# Patient Record
Sex: FEMALE | Race: WHITE | Employment: OTHER | URBAN - METROPOLITAN AREA
[De-identification: names, ages, dates, MRNs, and addresses within clinical notes are randomized per-mention and may not be internally consistent; named-entity substitution may affect disease eponyms.]

---

## 2019-02-05 ENCOUNTER — OFFICE VISIT (OUTPATIENT)
Dept: FAMILY MEDICINE CLINIC | Age: 84
End: 2019-02-05
Payer: MEDICARE

## 2019-02-05 VITALS
OXYGEN SATURATION: 98 % | TEMPERATURE: 98.3 F | HEART RATE: 78 BPM | WEIGHT: 200 LBS | DIASTOLIC BLOOD PRESSURE: 70 MMHG | BODY MASS INDEX: 33.32 KG/M2 | SYSTOLIC BLOOD PRESSURE: 120 MMHG | HEIGHT: 65 IN

## 2019-02-05 DIAGNOSIS — J18.9 PNEUMONIA DUE TO INFECTIOUS ORGANISM, UNSPECIFIED LATERALITY, UNSPECIFIED PART OF LUNG: Primary | ICD-10-CM

## 2019-02-05 PROCEDURE — 99204 OFFICE O/P NEW MOD 45 MIN: CPT | Performed by: NURSE PRACTITIONER

## 2019-02-05 PROCEDURE — 1101F PT FALLS ASSESS-DOCD LE1/YR: CPT | Performed by: NURSE PRACTITIONER

## 2019-02-05 PROCEDURE — G8400 PT W/DXA NO RESULTS DOC: HCPCS | Performed by: NURSE PRACTITIONER

## 2019-02-05 PROCEDURE — 1036F TOBACCO NON-USER: CPT | Performed by: NURSE PRACTITIONER

## 2019-02-05 PROCEDURE — G8427 DOCREV CUR MEDS BY ELIG CLIN: HCPCS | Performed by: NURSE PRACTITIONER

## 2019-02-05 PROCEDURE — G8417 CALC BMI ABV UP PARAM F/U: HCPCS | Performed by: NURSE PRACTITIONER

## 2019-02-05 PROCEDURE — 1090F PRES/ABSN URINE INCON ASSESS: CPT | Performed by: NURSE PRACTITIONER

## 2019-02-05 PROCEDURE — G8484 FLU IMMUNIZE NO ADMIN: HCPCS | Performed by: NURSE PRACTITIONER

## 2019-02-05 PROCEDURE — 1123F ACP DISCUSS/DSCN MKR DOCD: CPT | Performed by: NURSE PRACTITIONER

## 2019-02-05 PROCEDURE — 4040F PNEUMOC VAC/ADMIN/RCVD: CPT | Performed by: NURSE PRACTITIONER

## 2019-02-05 RX ORDER — POTASSIUM CHLORIDE 20 MEQ/1
TABLET, EXTENDED RELEASE ORAL
COMMUNITY
End: 2021-11-10 | Stop reason: CLARIF

## 2019-02-05 RX ORDER — AMITRIPTYLINE HYDROCHLORIDE 10 MG/1
TABLET, FILM COATED ORAL
COMMUNITY
End: 2021-12-10 | Stop reason: SDUPTHER

## 2019-02-05 RX ORDER — DICLOFENAC SODIUM 75 MG/1
TABLET, DELAYED RELEASE ORAL
COMMUNITY
Start: 2019-01-05 | End: 2021-11-10 | Stop reason: CLARIF

## 2019-02-05 RX ORDER — VALSARTAN 80 MG/1
TABLET ORAL
COMMUNITY
End: 2019-04-12 | Stop reason: DRUGHIGH

## 2019-02-05 RX ORDER — LEVOTHYROXINE SODIUM 88 UG/1
TABLET ORAL
COMMUNITY
End: 2019-04-12 | Stop reason: SDUPTHER

## 2019-02-05 RX ORDER — DOXYCYCLINE HYCLATE 100 MG
100 TABLET ORAL 2 TIMES DAILY
Qty: 20 TABLET | Refills: 0 | Status: SHIPPED | OUTPATIENT
Start: 2019-02-05 | End: 2019-02-15

## 2019-02-05 RX ORDER — ROPINIROLE 0.5 MG/1
TABLET, FILM COATED ORAL
COMMUNITY
End: 2019-04-12 | Stop reason: SDUPTHER

## 2019-02-05 RX ORDER — ESOMEPRAZOLE MAGNESIUM 40 MG/1
CAPSULE, DELAYED RELEASE ORAL
COMMUNITY

## 2019-02-05 RX ORDER — FUROSEMIDE 20 MG/1
TABLET ORAL
COMMUNITY
End: 2022-06-16 | Stop reason: SDUPTHER

## 2019-02-05 ASSESSMENT — ENCOUNTER SYMPTOMS
RHINORRHEA: 1
SHORTNESS OF BREATH: 1
WHEEZING: 1
SORE THROAT: 0
COUGH: 1

## 2019-02-21 ENCOUNTER — OFFICE VISIT (OUTPATIENT)
Dept: FAMILY MEDICINE CLINIC | Age: 84
End: 2019-02-21
Payer: MEDICARE

## 2019-02-21 VITALS
OXYGEN SATURATION: 97 % | RESPIRATION RATE: 15 BRPM | BODY MASS INDEX: 33.32 KG/M2 | WEIGHT: 200 LBS | DIASTOLIC BLOOD PRESSURE: 74 MMHG | TEMPERATURE: 98.5 F | HEART RATE: 93 BPM | SYSTOLIC BLOOD PRESSURE: 122 MMHG | HEIGHT: 65 IN

## 2019-02-21 DIAGNOSIS — E03.9 HYPOTHYROIDISM, UNSPECIFIED TYPE: ICD-10-CM

## 2019-02-21 DIAGNOSIS — M25.562 BILATERAL CHRONIC KNEE PAIN: ICD-10-CM

## 2019-02-21 DIAGNOSIS — J84.10 PULMONARY FIBROSIS (HCC): ICD-10-CM

## 2019-02-21 DIAGNOSIS — J40 BRONCHITIS: Primary | ICD-10-CM

## 2019-02-21 DIAGNOSIS — G25.81 RLS (RESTLESS LEGS SYNDROME): ICD-10-CM

## 2019-02-21 DIAGNOSIS — G89.29 BILATERAL CHRONIC KNEE PAIN: ICD-10-CM

## 2019-02-21 DIAGNOSIS — M25.561 BILATERAL CHRONIC KNEE PAIN: ICD-10-CM

## 2019-02-21 DIAGNOSIS — E55.9 VITAMIN D DEFICIENCY: ICD-10-CM

## 2019-02-21 PROBLEM — K21.9 GASTROESOPHAGEAL REFLUX DISEASE: Status: ACTIVE | Noted: 2019-02-21

## 2019-02-21 PROBLEM — M25.519 SHOULDER JOINT PAIN: Status: ACTIVE | Noted: 2019-02-21

## 2019-02-21 PROBLEM — M25.569 KNEE PAIN: Status: ACTIVE | Noted: 2019-02-21

## 2019-02-21 PROBLEM — M47.812 CERVICAL SPONDYLOSIS WITHOUT MYELOPATHY: Status: ACTIVE | Noted: 2019-02-21

## 2019-02-21 PROBLEM — M15.9 GENERALIZED OSTEOARTHRITIS: Status: ACTIVE | Noted: 2019-02-21

## 2019-02-21 PROBLEM — M17.10 ARTHRITIS OF KNEE: Status: ACTIVE | Noted: 2019-02-21

## 2019-02-21 PROBLEM — M25.559 HIP PAIN: Status: ACTIVE | Noted: 2019-02-21

## 2019-02-21 PROBLEM — M94.9 DISORDER OF BONE AND ARTICULAR CARTILAGE: Status: ACTIVE | Noted: 2019-02-21

## 2019-02-21 PROBLEM — Z91.81 AT RISK FOR FALLS: Status: ACTIVE | Noted: 2019-02-21

## 2019-02-21 PROBLEM — H40.9 GLAUCOMA: Status: ACTIVE | Noted: 2019-02-21

## 2019-02-21 PROBLEM — F32.1 MODERATE MAJOR DEPRESSION (HCC): Status: ACTIVE | Noted: 2018-06-29

## 2019-02-21 PROBLEM — L57.0 ACTINIC KERATOSIS: Status: ACTIVE | Noted: 2019-02-21

## 2019-02-21 PROBLEM — E66.9 OBESITY: Status: ACTIVE | Noted: 2019-02-21

## 2019-02-21 PROBLEM — M54.17 LUMBOSACRAL RADICULITIS: Status: ACTIVE | Noted: 2019-02-21

## 2019-02-21 PROBLEM — M54.50 LOW BACK PAIN: Status: ACTIVE | Noted: 2019-02-21

## 2019-02-21 PROBLEM — M47.817 LUMBOSACRAL SPONDYLOSIS WITHOUT MYELOPATHY: Status: ACTIVE | Noted: 2019-02-21

## 2019-02-21 PROBLEM — M89.9 DISORDER OF BONE AND ARTICULAR CARTILAGE: Status: ACTIVE | Noted: 2019-02-21

## 2019-02-21 PROBLEM — H40.023 OPEN ANGLE WITH BORDERLINE FINDINGS AND HIGH GLAUCOMA RISK IN BOTH EYES: Status: ACTIVE | Noted: 2019-02-21

## 2019-02-21 PROBLEM — M25.579 PAIN IN JOINT INVOLVING ANKLE AND FOOT: Status: ACTIVE | Noted: 2019-02-21

## 2019-02-21 PROBLEM — M24.549 CONTRACTURE OF JOINT OF HAND: Status: ACTIVE | Noted: 2019-02-21

## 2019-02-21 PROBLEM — E78.2 MIXED HYPERLIPIDEMIA: Status: ACTIVE | Noted: 2019-02-21

## 2019-02-21 PROBLEM — M17.9 OSTEOARTHRITIS OF KNEE: Status: ACTIVE | Noted: 2018-05-22

## 2019-02-21 PROBLEM — I10 ESSENTIAL HYPERTENSION: Status: ACTIVE | Noted: 2019-02-21

## 2019-02-21 PROBLEM — M16.9 OSTEOARTHRITIS OF HIP: Status: ACTIVE | Noted: 2018-05-22

## 2019-02-21 PROBLEM — M13.80 ALLERGIC ARTHRITIS: Status: ACTIVE | Noted: 2019-02-21

## 2019-02-21 PROCEDURE — 4040F PNEUMOC VAC/ADMIN/RCVD: CPT | Performed by: INTERNAL MEDICINE

## 2019-02-21 PROCEDURE — 1123F ACP DISCUSS/DSCN MKR DOCD: CPT | Performed by: INTERNAL MEDICINE

## 2019-02-21 PROCEDURE — G8417 CALC BMI ABV UP PARAM F/U: HCPCS | Performed by: INTERNAL MEDICINE

## 2019-02-21 PROCEDURE — G8482 FLU IMMUNIZE ORDER/ADMIN: HCPCS | Performed by: INTERNAL MEDICINE

## 2019-02-21 PROCEDURE — G8427 DOCREV CUR MEDS BY ELIG CLIN: HCPCS | Performed by: INTERNAL MEDICINE

## 2019-02-21 PROCEDURE — 99214 OFFICE O/P EST MOD 30 MIN: CPT | Performed by: INTERNAL MEDICINE

## 2019-02-21 PROCEDURE — 1090F PRES/ABSN URINE INCON ASSESS: CPT | Performed by: INTERNAL MEDICINE

## 2019-02-21 PROCEDURE — G8400 PT W/DXA NO RESULTS DOC: HCPCS | Performed by: INTERNAL MEDICINE

## 2019-02-21 PROCEDURE — 1036F TOBACCO NON-USER: CPT | Performed by: INTERNAL MEDICINE

## 2019-02-21 PROCEDURE — 1101F PT FALLS ASSESS-DOCD LE1/YR: CPT | Performed by: INTERNAL MEDICINE

## 2019-02-21 RX ORDER — GABAPENTIN 100 MG/1
100 CAPSULE ORAL NIGHTLY
Qty: 30 CAPSULE | Refills: 0 | Status: SHIPPED | OUTPATIENT
Start: 2019-02-21 | End: 2019-03-14 | Stop reason: SDUPTHER

## 2019-02-21 RX ORDER — PREDNISONE 10 MG/1
TABLET ORAL
Qty: 21 TABLET | Refills: 0 | Status: SHIPPED | OUTPATIENT
Start: 2019-02-21 | End: 2019-03-01 | Stop reason: ALTCHOICE

## 2019-02-21 RX ORDER — AMOXICILLIN 875 MG/1
875 TABLET, COATED ORAL 2 TIMES DAILY
Qty: 14 TABLET | Refills: 0 | Status: SHIPPED | OUTPATIENT
Start: 2019-02-21 | End: 2019-02-28

## 2019-02-21 ASSESSMENT — PATIENT HEALTH QUESTIONNAIRE - PHQ9
2. FEELING DOWN, DEPRESSED OR HOPELESS: 1
SUM OF ALL RESPONSES TO PHQ QUESTIONS 1-9: 2
SUM OF ALL RESPONSES TO PHQ9 QUESTIONS 1 & 2: 2
1. LITTLE INTEREST OR PLEASURE IN DOING THINGS: 1
SUM OF ALL RESPONSES TO PHQ QUESTIONS 1-9: 2

## 2019-02-21 ASSESSMENT — ENCOUNTER SYMPTOMS
COUGH: 1
ABDOMINAL PAIN: 0
SHORTNESS OF BREATH: 0
EYE PAIN: 0
BACK PAIN: 0

## 2019-02-22 DIAGNOSIS — E55.9 VITAMIN D DEFICIENCY: ICD-10-CM

## 2019-02-22 DIAGNOSIS — E03.9 HYPOTHYROIDISM, UNSPECIFIED TYPE: ICD-10-CM

## 2019-02-22 DIAGNOSIS — J84.10 PULMONARY FIBROSIS (HCC): ICD-10-CM

## 2019-02-22 LAB
ALBUMIN SERPL-MCNC: 4.3 G/DL (ref 3.5–4.6)
ALP BLD-CCNC: 93 U/L (ref 40–130)
ALT SERPL-CCNC: 13 U/L (ref 0–33)
ANION GAP SERPL CALCULATED.3IONS-SCNC: 20 MEQ/L (ref 9–15)
AST SERPL-CCNC: 26 U/L (ref 0–35)
BASOPHILS ABSOLUTE: 0.1 K/UL (ref 0–0.2)
BASOPHILS RELATIVE PERCENT: 1.2 %
BILIRUB SERPL-MCNC: 0.3 MG/DL (ref 0.2–0.7)
BUN BLDV-MCNC: 16 MG/DL (ref 8–23)
CALCIUM SERPL-MCNC: 8.8 MG/DL (ref 8.5–9.9)
CHLORIDE BLD-SCNC: 101 MEQ/L (ref 95–107)
CHOLESTEROL, TOTAL: 267 MG/DL (ref 0–199)
CO2: 19 MEQ/L (ref 20–31)
CREAT SERPL-MCNC: 0.61 MG/DL (ref 0.5–0.9)
EOSINOPHILS ABSOLUTE: 0 K/UL (ref 0–0.7)
EOSINOPHILS RELATIVE PERCENT: 0.1 %
GFR AFRICAN AMERICAN: >60
GFR NON-AFRICAN AMERICAN: >60
GLOBULIN: 3.4 G/DL (ref 2.3–3.5)
GLUCOSE BLD-MCNC: 88 MG/DL (ref 70–99)
HCT VFR BLD CALC: 38 % (ref 37–47)
HDLC SERPL-MCNC: 61 MG/DL (ref 40–59)
HEMOGLOBIN: 12 G/DL (ref 12–16)
LDL CHOLESTEROL CALCULATED: 176 MG/DL (ref 0–129)
LYMPHOCYTES ABSOLUTE: 3.2 K/UL (ref 1–4.8)
LYMPHOCYTES RELATIVE PERCENT: 38 %
MCH RBC QN AUTO: 26.1 PG (ref 27–31.3)
MCHC RBC AUTO-ENTMCNC: 31.6 % (ref 33–37)
MCV RBC AUTO: 82.4 FL (ref 82–100)
MONOCYTES ABSOLUTE: 0.6 K/UL (ref 0.2–0.8)
MONOCYTES RELATIVE PERCENT: 7.6 %
NEUTROPHILS ABSOLUTE: 4.5 K/UL (ref 1.4–6.5)
NEUTROPHILS RELATIVE PERCENT: 53.1 %
PDW BLD-RTO: 16.6 % (ref 11.5–14.5)
PLATELET # BLD: 209 K/UL (ref 130–400)
POTASSIUM SERPL-SCNC: 4.5 MEQ/L (ref 3.4–4.9)
RBC # BLD: 4.6 M/UL (ref 4.2–5.4)
SODIUM BLD-SCNC: 140 MEQ/L (ref 135–144)
TOTAL PROTEIN: 7.7 G/DL (ref 6.3–8)
TRIGL SERPL-MCNC: 150 MG/DL (ref 0–150)
TSH REFLEX: 5.24 UIU/ML (ref 0.44–3.86)
VITAMIN D 25-HYDROXY: 18.4 NG/ML (ref 30–100)
WBC # BLD: 8.5 K/UL (ref 4.8–10.8)

## 2019-02-23 LAB — T4 FREE: 0.98 NG/DL (ref 0.84–1.68)

## 2019-03-01 ENCOUNTER — TELEPHONE (OUTPATIENT)
Dept: FAMILY MEDICINE CLINIC | Age: 84
End: 2019-03-01

## 2019-03-01 ENCOUNTER — OFFICE VISIT (OUTPATIENT)
Dept: FAMILY MEDICINE CLINIC | Age: 84
End: 2019-03-01
Payer: MEDICARE

## 2019-03-01 VITALS
WEIGHT: 195.3 LBS | BODY MASS INDEX: 32.54 KG/M2 | OXYGEN SATURATION: 98 % | TEMPERATURE: 98.3 F | SYSTOLIC BLOOD PRESSURE: 124 MMHG | HEIGHT: 65 IN | RESPIRATION RATE: 15 BRPM | DIASTOLIC BLOOD PRESSURE: 78 MMHG | HEART RATE: 81 BPM

## 2019-03-01 DIAGNOSIS — R09.82 POST-NASAL DRIP: ICD-10-CM

## 2019-03-01 DIAGNOSIS — E55.9 VITAMIN D DEFICIENCY: ICD-10-CM

## 2019-03-01 DIAGNOSIS — E78.5 HYPERLIPIDEMIA, UNSPECIFIED HYPERLIPIDEMIA TYPE: ICD-10-CM

## 2019-03-01 DIAGNOSIS — M48.061 SPINAL STENOSIS OF LUMBAR REGION WITHOUT NEUROGENIC CLAUDICATION: ICD-10-CM

## 2019-03-01 DIAGNOSIS — N30.00 ACUTE CYSTITIS WITHOUT HEMATURIA: Primary | ICD-10-CM

## 2019-03-01 DIAGNOSIS — N93.9 VAGINAL BLEEDING: ICD-10-CM

## 2019-03-01 DIAGNOSIS — R01.1 HEART MURMUR: ICD-10-CM

## 2019-03-01 DIAGNOSIS — N30.00 ACUTE CYSTITIS WITHOUT HEMATURIA: ICD-10-CM

## 2019-03-01 LAB
BACTERIA: ABNORMAL /HPF
BILIRUBIN URINE: NEGATIVE
BLOOD, URINE: ABNORMAL
CLARITY: CLEAR
COLOR: YELLOW
EPITHELIAL CELLS, UA: ABNORMAL /HPF (ref 0–5)
GLUCOSE URINE: NEGATIVE MG/DL
HYALINE CASTS: ABNORMAL /HPF (ref 0–5)
KETONES, URINE: NEGATIVE MG/DL
LEUKOCYTE ESTERASE, URINE: ABNORMAL
NITRITE, URINE: NEGATIVE
PH UA: 6.5 (ref 5–9)
PROTEIN UA: NEGATIVE MG/DL
RBC UA: ABNORMAL /HPF (ref 0–5)
SPECIFIC GRAVITY UA: 1.01 (ref 1–1.03)
UROBILINOGEN, URINE: 0.2 E.U./DL
WBC UA: ABNORMAL /HPF (ref 0–5)

## 2019-03-01 PROCEDURE — G8400 PT W/DXA NO RESULTS DOC: HCPCS | Performed by: INTERNAL MEDICINE

## 2019-03-01 PROCEDURE — 1090F PRES/ABSN URINE INCON ASSESS: CPT | Performed by: INTERNAL MEDICINE

## 2019-03-01 PROCEDURE — 1036F TOBACCO NON-USER: CPT | Performed by: INTERNAL MEDICINE

## 2019-03-01 PROCEDURE — G8482 FLU IMMUNIZE ORDER/ADMIN: HCPCS | Performed by: INTERNAL MEDICINE

## 2019-03-01 PROCEDURE — G8427 DOCREV CUR MEDS BY ELIG CLIN: HCPCS | Performed by: INTERNAL MEDICINE

## 2019-03-01 PROCEDURE — 1123F ACP DISCUSS/DSCN MKR DOCD: CPT | Performed by: INTERNAL MEDICINE

## 2019-03-01 PROCEDURE — 99214 OFFICE O/P EST MOD 30 MIN: CPT | Performed by: INTERNAL MEDICINE

## 2019-03-01 PROCEDURE — G8417 CALC BMI ABV UP PARAM F/U: HCPCS | Performed by: INTERNAL MEDICINE

## 2019-03-01 PROCEDURE — 4040F PNEUMOC VAC/ADMIN/RCVD: CPT | Performed by: INTERNAL MEDICINE

## 2019-03-01 PROCEDURE — 1101F PT FALLS ASSESS-DOCD LE1/YR: CPT | Performed by: INTERNAL MEDICINE

## 2019-03-01 RX ORDER — FEXOFENADINE HCL 180 MG/1
180 TABLET ORAL DAILY
Qty: 30 TABLET | Refills: 0 | Status: SHIPPED | OUTPATIENT
Start: 2019-03-01 | End: 2019-04-03 | Stop reason: SDUPTHER

## 2019-03-01 RX ORDER — SULFAMETHOXAZOLE AND TRIMETHOPRIM 800; 160 MG/1; MG/1
1 TABLET ORAL 2 TIMES DAILY
Qty: 10 TABLET | Refills: 0 | Status: SHIPPED | OUTPATIENT
Start: 2019-03-01 | End: 2019-03-06

## 2019-03-01 RX ORDER — FLUTICASONE PROPIONATE 50 MCG
1 SPRAY, SUSPENSION (ML) NASAL DAILY
Qty: 1 BOTTLE | Refills: 0 | Status: SHIPPED | OUTPATIENT
Start: 2019-03-01 | End: 2021-11-10

## 2019-03-01 RX ORDER — ERGOCALCIFEROL 1.25 MG/1
50000 CAPSULE ORAL WEEKLY
Qty: 12 CAPSULE | Refills: 0 | Status: SHIPPED | OUTPATIENT
Start: 2019-03-01 | End: 2021-11-10 | Stop reason: ALTCHOICE

## 2019-03-01 ASSESSMENT — ENCOUNTER SYMPTOMS
SHORTNESS OF BREATH: 0
BACK PAIN: 0
ABDOMINAL PAIN: 0
EYE PAIN: 0
COUGH: 1

## 2019-03-03 ENCOUNTER — TELEPHONE (OUTPATIENT)
Dept: FAMILY MEDICINE CLINIC | Age: 84
End: 2019-03-03

## 2019-03-04 ENCOUNTER — TELEPHONE (OUTPATIENT)
Dept: FAMILY MEDICINE CLINIC | Age: 84
End: 2019-03-04

## 2019-03-04 LAB
ORGANISM: ABNORMAL
URINE CULTURE, ROUTINE: ABNORMAL
URINE CULTURE, ROUTINE: ABNORMAL

## 2019-03-05 DIAGNOSIS — E78.5 HYPERLIPIDEMIA, UNSPECIFIED HYPERLIPIDEMIA TYPE: Primary | ICD-10-CM

## 2019-03-07 ENCOUNTER — TELEPHONE (OUTPATIENT)
Dept: FAMILY MEDICINE CLINIC | Age: 84
End: 2019-03-07

## 2019-03-07 DIAGNOSIS — M17.0 OSTEOARTHRITIS OF BOTH KNEES, UNSPECIFIED OSTEOARTHRITIS TYPE: ICD-10-CM

## 2019-03-07 DIAGNOSIS — M48.061 SPINAL STENOSIS OF LUMBAR REGION, UNSPECIFIED WHETHER NEUROGENIC CLAUDICATION PRESENT: Primary | ICD-10-CM

## 2019-03-07 DIAGNOSIS — E78.5 HYPERLIPIDEMIA, UNSPECIFIED HYPERLIPIDEMIA TYPE: ICD-10-CM

## 2019-03-07 LAB
CHOLESTEROL, TOTAL: 255 MG/DL (ref 0–199)
HDLC SERPL-MCNC: 59 MG/DL (ref 40–59)
LDL CHOLESTEROL CALCULATED: 149 MG/DL (ref 0–129)
TRIGL SERPL-MCNC: 233 MG/DL (ref 0–150)

## 2019-03-08 DIAGNOSIS — M48.061 SPINAL STENOSIS OF LUMBAR REGION WITHOUT NEUROGENIC CLAUDICATION: Primary | ICD-10-CM

## 2019-03-08 DIAGNOSIS — M17.0 PRIMARY OSTEOARTHRITIS OF BOTH KNEES: ICD-10-CM

## 2019-03-12 ENCOUNTER — TELEPHONE (OUTPATIENT)
Dept: FAMILY MEDICINE CLINIC | Age: 84
End: 2019-03-12

## 2019-03-13 ENCOUNTER — OFFICE VISIT (OUTPATIENT)
Dept: PULMONOLOGY | Age: 84
End: 2019-03-13
Payer: MEDICARE

## 2019-03-13 VITALS
HEIGHT: 65 IN | TEMPERATURE: 98.6 F | BODY MASS INDEX: 32.49 KG/M2 | SYSTOLIC BLOOD PRESSURE: 132 MMHG | DIASTOLIC BLOOD PRESSURE: 88 MMHG | WEIGHT: 195 LBS | HEART RATE: 76 BPM | OXYGEN SATURATION: 98 %

## 2019-03-13 DIAGNOSIS — J30.9 ALLERGIC RHINITIS, UNSPECIFIED SEASONALITY, UNSPECIFIED TRIGGER: ICD-10-CM

## 2019-03-13 DIAGNOSIS — E66.09 CLASS 1 OBESITY DUE TO EXCESS CALORIES WITHOUT SERIOUS COMORBIDITY WITH BODY MASS INDEX (BMI) OF 32.0 TO 32.9 IN ADULT: ICD-10-CM

## 2019-03-13 DIAGNOSIS — R05.9 COUGH: ICD-10-CM

## 2019-03-13 DIAGNOSIS — G47.30 SLEEP APNEA, UNSPECIFIED TYPE: ICD-10-CM

## 2019-03-13 DIAGNOSIS — R06.02 SOB (SHORTNESS OF BREATH): Primary | ICD-10-CM

## 2019-03-13 PROCEDURE — 1101F PT FALLS ASSESS-DOCD LE1/YR: CPT | Performed by: INTERNAL MEDICINE

## 2019-03-13 PROCEDURE — 1123F ACP DISCUSS/DSCN MKR DOCD: CPT | Performed by: INTERNAL MEDICINE

## 2019-03-13 PROCEDURE — G8482 FLU IMMUNIZE ORDER/ADMIN: HCPCS | Performed by: INTERNAL MEDICINE

## 2019-03-13 PROCEDURE — 99204 OFFICE O/P NEW MOD 45 MIN: CPT | Performed by: INTERNAL MEDICINE

## 2019-03-13 PROCEDURE — G8417 CALC BMI ABV UP PARAM F/U: HCPCS | Performed by: INTERNAL MEDICINE

## 2019-03-13 PROCEDURE — G8427 DOCREV CUR MEDS BY ELIG CLIN: HCPCS | Performed by: INTERNAL MEDICINE

## 2019-03-13 PROCEDURE — 1090F PRES/ABSN URINE INCON ASSESS: CPT | Performed by: INTERNAL MEDICINE

## 2019-03-13 PROCEDURE — 1036F TOBACCO NON-USER: CPT | Performed by: INTERNAL MEDICINE

## 2019-03-13 PROCEDURE — 4040F PNEUMOC VAC/ADMIN/RCVD: CPT | Performed by: INTERNAL MEDICINE

## 2019-03-13 PROCEDURE — G8400 PT W/DXA NO RESULTS DOC: HCPCS | Performed by: INTERNAL MEDICINE

## 2019-03-13 RX ORDER — AZELASTINE 1 MG/ML
1 SPRAY, METERED NASAL 2 TIMES DAILY
Qty: 1 BOTTLE | Refills: 3 | Status: SHIPPED | OUTPATIENT
Start: 2019-03-13 | End: 2021-11-10 | Stop reason: CLARIF

## 2019-03-14 ENCOUNTER — HOSPITAL ENCOUNTER (OUTPATIENT)
Dept: SLEEP CENTER | Age: 84
Discharge: HOME | End: 2019-03-16
Payer: MEDICARE

## 2019-03-14 DIAGNOSIS — G25.81 RLS (RESTLESS LEGS SYNDROME): ICD-10-CM

## 2019-03-14 DIAGNOSIS — G89.29 BILATERAL CHRONIC KNEE PAIN: ICD-10-CM

## 2019-03-14 DIAGNOSIS — M25.562 BILATERAL CHRONIC KNEE PAIN: ICD-10-CM

## 2019-03-14 DIAGNOSIS — M25.561 BILATERAL CHRONIC KNEE PAIN: ICD-10-CM

## 2019-03-14 PROCEDURE — 95810 POLYSOM 6/> YRS 4/> PARAM: CPT | Performed by: INTERNAL MEDICINE

## 2019-03-14 PROCEDURE — 95810 POLYSOM 6/> YRS 4/> PARAM: CPT

## 2019-03-14 RX ORDER — GABAPENTIN 100 MG/1
100 CAPSULE ORAL NIGHTLY
Qty: 30 CAPSULE | Refills: 2 | Status: SHIPPED | OUTPATIENT
Start: 2019-03-24 | End: 2021-11-10

## 2019-03-18 ENCOUNTER — TELEPHONE (OUTPATIENT)
Dept: FAMILY MEDICINE CLINIC | Age: 84
End: 2019-03-18

## 2019-03-22 ENCOUNTER — HOSPITAL ENCOUNTER (OUTPATIENT)
Dept: PULMONOLOGY | Age: 84
Discharge: HOME | End: 2019-03-22
Payer: MEDICARE

## 2019-03-22 ENCOUNTER — HOSPITAL ENCOUNTER (OUTPATIENT)
Dept: CT IMAGING | Age: 84
Discharge: HOME | End: 2019-03-24
Payer: MEDICARE

## 2019-03-22 ENCOUNTER — HOSPITAL ENCOUNTER (OUTPATIENT)
Dept: NON INVASIVE DIAGNOSTICS | Age: 84
Discharge: HOME | End: 2019-03-22
Payer: MEDICARE

## 2019-03-22 VITALS — BODY MASS INDEX: 33.29 KG/M2 | HEIGHT: 64 IN | WEIGHT: 195 LBS

## 2019-03-22 DIAGNOSIS — J84.10 PULMONARY FIBROSIS (HCC): ICD-10-CM

## 2019-03-22 DIAGNOSIS — R01.1 HEART MURMUR: ICD-10-CM

## 2019-03-22 LAB
LV EF: 60 %
LVEF MODALITY: NORMAL

## 2019-03-22 PROCEDURE — 6360000002 HC RX W HCPCS: Performed by: INTERNAL MEDICINE

## 2019-03-22 PROCEDURE — 94726 PLETHYSMOGRAPHY LUNG VOLUMES: CPT | Performed by: INTERNAL MEDICINE

## 2019-03-22 PROCEDURE — 94060 EVALUATION OF WHEEZING: CPT | Performed by: INTERNAL MEDICINE

## 2019-03-22 PROCEDURE — 71250 CT THORAX DX C-: CPT

## 2019-03-22 PROCEDURE — 94726 PLETHYSMOGRAPHY LUNG VOLUMES: CPT

## 2019-03-22 PROCEDURE — 94060 EVALUATION OF WHEEZING: CPT

## 2019-03-22 PROCEDURE — 93306 TTE W/DOPPLER COMPLETE: CPT

## 2019-03-22 RX ORDER — SODIUM CHLORIDE 0.9 % (FLUSH) 0.9 %
10 SYRINGE (ML) INJECTION
Status: DISCONTINUED | OUTPATIENT
Start: 2019-03-22 | End: 2019-03-22

## 2019-03-22 RX ORDER — ALBUTEROL SULFATE 2.5 MG/3ML
2.5 SOLUTION RESPIRATORY (INHALATION) ONCE
Status: COMPLETED | OUTPATIENT
Start: 2019-03-22 | End: 2019-03-22

## 2019-03-22 RX ADMIN — ALBUTEROL SULFATE 2.5 MG: 2.5 SOLUTION RESPIRATORY (INHALATION) at 15:15

## 2019-03-25 DIAGNOSIS — G47.30 SLEEP APNEA, UNSPECIFIED TYPE: ICD-10-CM

## 2019-04-03 ENCOUNTER — OFFICE VISIT (OUTPATIENT)
Dept: PULMONOLOGY | Age: 84
End: 2019-04-03
Payer: MEDICARE

## 2019-04-03 VITALS
BODY MASS INDEX: 32.39 KG/M2 | DIASTOLIC BLOOD PRESSURE: 80 MMHG | TEMPERATURE: 97.6 F | WEIGHT: 194.4 LBS | SYSTOLIC BLOOD PRESSURE: 126 MMHG | HEIGHT: 65 IN | OXYGEN SATURATION: 98 % | HEART RATE: 77 BPM

## 2019-04-03 DIAGNOSIS — J30.9 ALLERGIC RHINITIS, UNSPECIFIED SEASONALITY, UNSPECIFIED TRIGGER: ICD-10-CM

## 2019-04-03 DIAGNOSIS — E66.09 CLASS 1 OBESITY DUE TO EXCESS CALORIES WITHOUT SERIOUS COMORBIDITY WITH BODY MASS INDEX (BMI) OF 32.0 TO 32.9 IN ADULT: ICD-10-CM

## 2019-04-03 DIAGNOSIS — R06.09 DOE (DYSPNEA ON EXERTION): ICD-10-CM

## 2019-04-03 DIAGNOSIS — I51.89 DIASTOLIC DYSFUNCTION: ICD-10-CM

## 2019-04-03 DIAGNOSIS — J43.2 CENTRILOBULAR EMPHYSEMA (HCC): ICD-10-CM

## 2019-04-03 DIAGNOSIS — R05.9 COUGH: Primary | ICD-10-CM

## 2019-04-03 PROCEDURE — 4040F PNEUMOC VAC/ADMIN/RCVD: CPT | Performed by: INTERNAL MEDICINE

## 2019-04-03 PROCEDURE — G8417 CALC BMI ABV UP PARAM F/U: HCPCS | Performed by: INTERNAL MEDICINE

## 2019-04-03 PROCEDURE — 1036F TOBACCO NON-USER: CPT | Performed by: INTERNAL MEDICINE

## 2019-04-03 PROCEDURE — 99214 OFFICE O/P EST MOD 30 MIN: CPT | Performed by: INTERNAL MEDICINE

## 2019-04-03 PROCEDURE — G8400 PT W/DXA NO RESULTS DOC: HCPCS | Performed by: INTERNAL MEDICINE

## 2019-04-03 PROCEDURE — G8427 DOCREV CUR MEDS BY ELIG CLIN: HCPCS | Performed by: INTERNAL MEDICINE

## 2019-04-03 PROCEDURE — G8926 SPIRO NO PERF OR DOC: HCPCS | Performed by: INTERNAL MEDICINE

## 2019-04-03 PROCEDURE — 1090F PRES/ABSN URINE INCON ASSESS: CPT | Performed by: INTERNAL MEDICINE

## 2019-04-03 PROCEDURE — 1123F ACP DISCUSS/DSCN MKR DOCD: CPT | Performed by: INTERNAL MEDICINE

## 2019-04-03 PROCEDURE — 3023F SPIROM DOC REV: CPT | Performed by: INTERNAL MEDICINE

## 2019-04-03 NOTE — PROGRESS NOTES
Subjective:     Shelia Flores is a 82 y.o. female whocomplains today of:     Chief Complaint   Patient presents with    Follow-up     SOB    Results     SS       HPI  Patient presents for SOB   Reports SOB with exertion, still same no significant changes, slows her down but still able to do her daily activity with no limitation    No chest pain   + GERD   Cough is improved and mainly feels like throat clearing,   No swelling in lower ext   No fever and no weight change         Allergies:  Azithromycin and Ceftin  [cefuroxime axetil]     Past Medical History:   Diagnosis Date    Arthritis of both knees     Deviated septum     per patient     [facility] [address]     Pulmonary fibrosis ([facility] [address]     per patient     Spinal stenosis      History reviewed. No pertinent surgical history.   Family History   Problem Relation Age of Onset    High Cholesterol Mother     High Cholesterol Father     Heart Failure Father     High Cholesterol Sister     Heart Failure Sister     Heart Failure Maternal Grandmother     High Cholesterol Maternal Grandmother     Heart Failure Paternal Grandfather     High Cholesterol Paternal Grandfather      Social History     Socioeconomic History    Marital status:      Spouse name: Not on file    Number of children: Not on file    Years of education: Not on file    Highest education level: Not on file   Occupational History    Not on file   Social Needs    Financial resource strain: Not on file    Food insecurity:     Worry: Not on file     Inability: Not on file    Transportation needs:     Medical: Not on file     Non-medical: Not on file   Tobacco Use    Smoking status: Never Smoker    Smokeless tobacco: Never Used   Substance and Sexual Activity    Alcohol use: No    Drug use: No    Sexual activity: Not on file   Lifestyle    Physical activity:     Days per week: Not on file     Minutes per session: Not on file    Stress: Not on file   Relationships    (LASIX) 20 MG tablet Lasix 20 mg tablet   TAKE 1 TABLET BY MOUTH ONCE DAILY AS NEEDED      esomeprazole (NEXIUM) 40 MG delayed release capsule Nexium 40 mg capsule,delayed release      rOPINIRole (REQUIP) 0.5 MG tablet ropinirole 0.5 mg tablet      levothyroxine (SYNTHROID) 88 MCG tablet Synthroid 88 mcg tablet       No current facility-administered medications for this visit. Objective:     Vitals:    04/03/19 1006   BP: 126/80   Site: Left Upper Arm   Pulse: 77   Temp: 97.6 °F (36.4 °C)   TempSrc: Tympanic   SpO2: 98%   Weight: 194 lb 6.4 oz (88.2 kg)   Height: 5' 5\" (1.651 m)         Physical Exam   Constitutional: She is oriented to person, place, and time. She appears well-developed and well-nourished. No distress. HENT:   Head: Normocephalic and atraumatic. Eyes: Pupils are equal, round, and reactive to light. Conjunctivae are normal.   Neck: Normal range of motion. Neck supple. Cardiovascular: Normal rate and regular rhythm. Exam reveals no gallop and no friction rub. No murmur heard. Pulmonary/Chest: Effort normal and breath sounds normal. No respiratory distress. She has no wheezes. She has no rales. She exhibits no tenderness. Abdominal: Soft. She exhibits no distension. There is no tenderness. There is no rebound. Musculoskeletal: She exhibits no edema or tenderness. Lymphadenopathy:     She has no cervical adenopathy. Neurological: She is alert and oriented to person, place, and time. Skin: Skin is warm and dry. She is not diaphoretic. No erythema. Psychiatric: She has a normal mood and affect. Judgment normal.     Imaging studies reviewed by me  CT chest reviewed by me, no fibrosis, scaring at the base, + emphysema   Lab results reviewed in chart  PFT mild restriction   ECHO: diastolic dysfunction, EF 01%     Assessment and Plan       Diagnosis Orders   1. Cough     2. ROBIN (dyspnea on exertion)  tiotropium (SPIRIVA RESPIMAT) 2.5 MCG/ACT AERS inhaler   3.  Class 1 obesity due to excess calories without serious comorbidity with body mass index (BMI) of 32.0 to 32.9 in adult     4. Allergic rhinitis, unspecified seasonality, unspecified trigger     5. Diastolic dysfunction     6. Centrilobular emphysema (HCC)  tiotropium (SPIRIVA RESPIMAT) 2.5 MCG/ACT AERS inhaler     · Cough, likely UACS , improved with Flonase and Azelastine, also has GERD and ON PPI. No pulm fibrosis on CT   · ROBIN, likely multifactorial due to obesity and diastolic dysfunction, and emphysema, will start LAMA and evaluate on follow up   · Allergic rhinitis, cont same       No orders of the defined types were placed in this encounter. Orders Placed This Encounter   Medications    tiotropium (SPIRIVA RESPIMAT) 2.5 MCG/ACT AERS inhaler     Sig: Inhale 2 puffs into the lungs daily     Dispense:  1 Inhaler     Refill:  2      Discussed with patient the importance of exercise and weight control and  overall health and well-being.     Reviewed with the patient: current clinical status, medications, activities and diet.      Side effects, adverse effects of the medication prescribed today, as well as treatment plan and result expectations have been discussed with the patient who expresses understanding and desires to proceed.      Return in about 3 months (around 7/3/2019).      Nvuia Giraldo MD

## 2019-04-12 ENCOUNTER — OFFICE VISIT (OUTPATIENT)
Dept: FAMILY MEDICINE CLINIC | Age: 84
End: 2019-04-12
Payer: MEDICARE

## 2019-04-12 ENCOUNTER — OFFICE VISIT (OUTPATIENT)
Dept: OBGYN CLINIC | Age: 84
End: 2019-04-12
Payer: MEDICARE

## 2019-04-12 VITALS
SYSTOLIC BLOOD PRESSURE: 152 MMHG | WEIGHT: 191 LBS | RESPIRATION RATE: 15 BRPM | BODY MASS INDEX: 31.82 KG/M2 | OXYGEN SATURATION: 97 % | DIASTOLIC BLOOD PRESSURE: 80 MMHG | TEMPERATURE: 97.6 F | HEART RATE: 75 BPM | HEIGHT: 65 IN

## 2019-04-12 VITALS
DIASTOLIC BLOOD PRESSURE: 72 MMHG | HEIGHT: 65 IN | WEIGHT: 193 LBS | BODY MASS INDEX: 32.15 KG/M2 | SYSTOLIC BLOOD PRESSURE: 132 MMHG

## 2019-04-12 DIAGNOSIS — E03.9 HYPOTHYROIDISM, UNSPECIFIED TYPE: ICD-10-CM

## 2019-04-12 DIAGNOSIS — I10 ESSENTIAL HYPERTENSION: ICD-10-CM

## 2019-04-12 DIAGNOSIS — G25.81 RESTLESS LEG SYNDROME: ICD-10-CM

## 2019-04-12 DIAGNOSIS — M48.062 SPINAL STENOSIS OF LUMBAR REGION WITH NEUROGENIC CLAUDICATION: Primary | ICD-10-CM

## 2019-04-12 DIAGNOSIS — N95.0 POSTMENOPAUSAL BLEEDING: ICD-10-CM

## 2019-04-12 DIAGNOSIS — M17.0 BILATERAL PRIMARY OSTEOARTHRITIS OF KNEE: ICD-10-CM

## 2019-04-12 DIAGNOSIS — N93.9 VAGINAL BLEEDING: Primary | ICD-10-CM

## 2019-04-12 PROCEDURE — 99204 OFFICE O/P NEW MOD 45 MIN: CPT | Performed by: OBSTETRICS & GYNECOLOGY

## 2019-04-12 PROCEDURE — 99214 OFFICE O/P EST MOD 30 MIN: CPT | Performed by: INTERNAL MEDICINE

## 2019-04-12 PROCEDURE — G8427 DOCREV CUR MEDS BY ELIG CLIN: HCPCS | Performed by: INTERNAL MEDICINE

## 2019-04-12 PROCEDURE — 1036F TOBACCO NON-USER: CPT | Performed by: INTERNAL MEDICINE

## 2019-04-12 PROCEDURE — 1090F PRES/ABSN URINE INCON ASSESS: CPT | Performed by: OBSTETRICS & GYNECOLOGY

## 2019-04-12 PROCEDURE — 1090F PRES/ABSN URINE INCON ASSESS: CPT | Performed by: INTERNAL MEDICINE

## 2019-04-12 PROCEDURE — 1123F ACP DISCUSS/DSCN MKR DOCD: CPT | Performed by: OBSTETRICS & GYNECOLOGY

## 2019-04-12 PROCEDURE — 4040F PNEUMOC VAC/ADMIN/RCVD: CPT | Performed by: OBSTETRICS & GYNECOLOGY

## 2019-04-12 PROCEDURE — 1123F ACP DISCUSS/DSCN MKR DOCD: CPT | Performed by: INTERNAL MEDICINE

## 2019-04-12 PROCEDURE — G8400 PT W/DXA NO RESULTS DOC: HCPCS | Performed by: INTERNAL MEDICINE

## 2019-04-12 PROCEDURE — 4040F PNEUMOC VAC/ADMIN/RCVD: CPT | Performed by: INTERNAL MEDICINE

## 2019-04-12 PROCEDURE — G8417 CALC BMI ABV UP PARAM F/U: HCPCS | Performed by: INTERNAL MEDICINE

## 2019-04-12 PROCEDURE — 1036F TOBACCO NON-USER: CPT | Performed by: OBSTETRICS & GYNECOLOGY

## 2019-04-12 PROCEDURE — G8400 PT W/DXA NO RESULTS DOC: HCPCS | Performed by: OBSTETRICS & GYNECOLOGY

## 2019-04-12 PROCEDURE — G8427 DOCREV CUR MEDS BY ELIG CLIN: HCPCS | Performed by: OBSTETRICS & GYNECOLOGY

## 2019-04-12 PROCEDURE — G8417 CALC BMI ABV UP PARAM F/U: HCPCS | Performed by: OBSTETRICS & GYNECOLOGY

## 2019-04-12 RX ORDER — LEVOTHYROXINE SODIUM 88 UG/1
TABLET ORAL
Qty: 90 TABLET | Refills: 1 | Status: SHIPPED | OUTPATIENT
Start: 2019-04-12 | End: 2021-12-10 | Stop reason: SDUPTHER

## 2019-04-12 RX ORDER — ROPINIROLE 0.5 MG/1
TABLET, FILM COATED ORAL
Qty: 90 TABLET | Refills: 1 | Status: SHIPPED | OUTPATIENT
Start: 2019-04-12 | End: 2021-12-10 | Stop reason: SDUPTHER

## 2019-04-12 RX ORDER — VALSARTAN 40 MG/1
40 TABLET ORAL DAILY
Qty: 30 TABLET | Refills: 1 | Status: SHIPPED | OUTPATIENT
Start: 2019-04-12 | End: 2021-11-10 | Stop reason: ALTCHOICE

## 2019-04-12 ASSESSMENT — ENCOUNTER SYMPTOMS
BACK PAIN: 0
SHORTNESS OF BREATH: 0
ABDOMINAL PAIN: 0
EYE PAIN: 0

## 2019-04-12 NOTE — PROGRESS NOTES
Subjective:      Patient ID: Joseph Alba is a 82 y.o. female who presents today with:  Chief Complaint   Patient presents with    Follow-up    Knee Pain     patient c/o pain in both knees, is awaiting information on the order for the scooter      Discuss Medications     patient mentions that she hasnt been taking the BP medication     Hypothyroidism    Other     spinal stenosis        HPI     Knee pain-Both knees, OA, never had steroid injections done. Known advanced age. Not walking     RLS-Chronic issue, improved with ropinirole. No side effects. Gabapentin also helps. She has known lumbar spinal stenosis. Lumbar spinal stenosis-Chronic problem, gabapentin helps. She says she's had it for 20 years. Last MRI was na. She's afraid to have a MRI done. Hypothyroidism-Hypothyroidism-Chronic, improved with synthroid 88 micrograms once daily. Compliant. Denies being cold. Stable. Hypertension     Past Medical History:   Diagnosis Date    Arthritis of both knees     Deviated septum     per patient     [facility] arthritis ([facility] [address]     Pulmonary fibrosis ([facility] [address]     per patient     Spinal stenosis      History reviewed. No pertinent surgical history.   Social History     Socioeconomic History    Marital status:      Spouse name: Not on file    Number of children: Not on file    Years of education: Not on file    Highest education level: Not on file   Occupational History    Not on file   Social Needs    Financial resource strain: Not on file    Food insecurity:     Worry: Not on file     Inability: Not on file    Transportation needs:     Medical: Not on file     Non-medical: Not on file   Tobacco Use    Smoking status: Never Smoker    Smokeless tobacco: Never Used   Substance and Sexual Activity    Alcohol use: No    Drug use: No    Sexual activity: Not Currently   Lifestyle    Physical activity:     Days per week: Not on file     Minutes per session: Not on file    Stress: Not on file   Relationships    Social connections:     Talks on phone: Not on file     Gets together: Not on file     Attends [employer] service: Not on file     Active member of club or organization: Not on file     Attends meetings of clubs or organizations: Not on file     Relationship status: Not on file    Intimate partner violence:     Fear of current or ex partner: Not on file     Emotionally abused: Not on file     Physically abused: Not on file     Forced sexual activity: Not on file   Other Topics Concern    Not on file   Social History Narrative    Not on file     Allergies   Allergen Reactions    Azithromycin Nausea And Vomiting    Ceftin  [Cefuroxime Axetil] Nausea And Vomiting     Current Outpatient Medications on File Prior to Visit   Medication Sig Dispense Refill    tiotropium (SPIRIVA RESPIMAT) 2.5 MCG/ACT AERS inhaler Inhale 2 puffs into the lungs daily 1 Inhaler 2    EQ ALLERGY RELIEF 180 MG tablet TAKE 1 TABLET BY MOUTH ONCE DAILY 90 tablet 1    gabapentin (NEURONTIN) 100 MG capsule Take 1 capsule by mouth nightly for 30 days.  30 capsule 2    azelastine (ASTELIN) 0.1 % nasal spray 1 spray by Nasal route 2 times daily Use in each nostril as directed 1 Bottle 3    Scooter MISC by Does not apply route Length of need 99 1 each 0    fluticasone (FLONASE) 50 MCG/ACT nasal spray 1 spray by Nasal route daily 1 Bottle 0    vitamin D (ERGOCALCIFEROL) 20307 units CAPS capsule Take 1 capsule by mouth once a week 12 capsule 0    Evolocumab (REPATHA) 140 MG/ML SOSY Inject 140 mg once a month 1 Syringe 5    amitriptyline (ELAVIL) 10 MG tablet amitriptyline 10 mg tablet   TAKE TWO TABLETS BY MOUTH ONCE DAILY AS NEEDED      Sucralfate (CARAFATE PO) Carafate      diclofenac (VOLTAREN) 75 MG EC tablet       potassium chloride (KLOR-CON M20) 20 MEQ extended release tablet Klor-Con M20 mEq tablet,extended release   TAKE ONE TABLET BY MOUTH TWICE DAILY AS NEEDED      furosemide (LASIX) 20 MG tablet Lasix CONTRAST   2. Bilateral primary osteoarthritis of knee     3. Hypothyroidism, unspecified type  levothyroxine (SYNTHROID) 88 MCG tablet   4. Restless leg syndrome  rOPINIRole (REQUIP) 0.5 MG tablet   5. Essential hypertension  valsartan (DIOVAN) 40 MG tablet         Plan:    bp check in a few days once she's back on the valsartan  They understands risk of stroke, MI, etc.   MRI  Neurosurgery  Patient to discuss with family about goals of care  Understands it will get worse  Meds per below  Restart diovan, bp check in a few days, might need increased to 80 mg once daily. Orders Placed This Encounter   Procedures    MRI LUMBAR SPINE W WO CONTRAST     Standing Status:   Future     Standing Expiration Date:   4/12/2020     Order Specific Question:   Reason for exam:     Answer:   known spinal stenosis    SUSANA Ibrahim MD, Neurosurgery, [place]     Referral Priority:   Routine     Referral Type:   Eval and Treat     Referral Reason:   Specialty Services Required     Referred to Provider:   Anmol Romero MD     Requested Specialty:   Neurosurgery     Number of Visits Requested:   1     Orders Placed This Encounter   Medications    rOPINIRole (REQUIP) 0.5 MG tablet     Sig: ropinirole 0.5 mg tablet     Dispense:  90 tablet     Refill:  1    levothyroxine (SYNTHROID) 88 MCG tablet     Sig: Synthroid 88 mcg tablet     Dispense:  90 tablet     Refill:  1    valsartan (DIOVAN) 40 MG tablet     Sig: Take 1 tablet by mouth daily     Dispense:  30 tablet     Refill:  1       Return for regularly scheduled appointment with PCP, worsening symptoms, call ASAP for appointment. Maurisio Escalona MD    If anything should change or worsen call ASAP, don't wait for next scheduled appointment.

## 2019-04-14 ASSESSMENT — ENCOUNTER SYMPTOMS
SHORTNESS OF BREATH: 0
ABDOMINAL PAIN: 1
APNEA: 0

## 2019-04-15 NOTE — PROGRESS NOTES
Subjective:      Patient ID:  Misael Tse is a 82 y.o. female with chief complaint of:  Chief Complaint   Patient presents with    New Patient    Vaginal Bleeding     pt states one episode about 6wks ago       Patient is 83 yo postmenopausal female she presents with history of acute onset of vaginal bleeding. She state blood was running down her leg when she finished bathing. She is not sure if there was any trauma with bathing she had never had such bleeding before or since. No pelvic pain no cramping with onset. Denies the use of blood thinners      Past Medical History:   Diagnosis Date    Arthritis of both knees     Deviated septum     per patient     L[facility] arthritis ([facility] [address]     Pulmonary fibrosis ([facility] [address]     per patient     Spinal stenosis      No past surgical history on file. Family History   Problem Relation Age of Onset    High Cholesterol Mother     High Cholesterol Father     Heart Failure Father     High Cholesterol Sister     Heart Failure Sister     Heart Failure Maternal Grandmother     High Cholesterol Maternal Grandmother     Heart Failure Paternal Grandfather     High Cholesterol Paternal Grandfather      Current Outpatient Medications on File Prior to Visit   Medication Sig Dispense Refill    rOPINIRole (REQUIP) 0.5 MG tablet ropinirole 0.5 mg tablet 90 tablet 1    levothyroxine (SYNTHROID) 88 MCG tablet Synthroid 88 mcg tablet 90 tablet 1    valsartan (DIOVAN) 40 MG tablet Take 1 tablet by mouth daily 30 tablet 1    tiotropium (SPIRIVA RESPIMAT) 2.5 MCG/ACT AERS inhaler Inhale 2 puffs into the lungs daily 1 Inhaler 2    EQ ALLERGY RELIEF 180 MG tablet TAKE 1 TABLET BY MOUTH ONCE DAILY 90 tablet 1    gabapentin (NEURONTIN) 100 MG capsule Take 1 capsule by mouth nightly for 30 days.  30 capsule 2    azelastine (ASTELIN) 0.1 % nasal spray 1 spray by Nasal route 2 times daily Use in each nostril as directed 1 Bottle 3    Scooter MISC by Does not apply route Length of need 99 1 each 0    fluticasone (FLONASE) 50 MCG/ACT nasal spray 1 spray by Nasal route daily 1 Bottle 0    vitamin D (ERGOCALCIFEROL) 82898 units CAPS capsule Take 1 capsule by mouth once a week 12 capsule 0    Evolocumab (REPATHA) 140 MG/ML SOSY Inject 140 mg once a month 1 Syringe 5    amitriptyline (ELAVIL) 10 MG tablet amitriptyline 10 mg tablet   TAKE TWO TABLETS BY MOUTH ONCE DAILY AS NEEDED      Sucralfate (CARAFATE PO) Carafate      diclofenac (VOLTAREN) 75 MG EC tablet       potassium chloride (KLOR-CON M20) 20 MEQ extended release tablet Klor-Con M20 mEq tablet,extended release   TAKE ONE TABLET BY MOUTH TWICE DAILY AS NEEDED      furosemide (LASIX) 20 MG tablet Lasix 20 mg tablet   TAKE 1 TABLET BY MOUTH ONCE DAILY AS NEEDED      esomeprazole (NEXIUM) 40 MG delayed release capsule Nexium 40 mg capsule,delayed release       No current facility-administered medications on file prior to visit. Allergies:  Azithromycin and Ceftin  [cefuroxime axetil]    Review of Systems   Constitutional: Negative for fatigue and fever. Respiratory: Negative for apnea and shortness of breath. Cardiovascular: Negative for chest pain and palpitations. Gastrointestinal: Positive for abdominal pain. Genitourinary: Positive for vaginal bleeding (no bleeding since the episode in question). Negative for difficulty urinating, dysuria, pelvic pain and vaginal discharge. Neurological: Negative for dizziness, weakness and light-headedness. Objective:   /72   Ht 5' 5\" (1.651 m)   Wt 193 lb (87.5 kg)   BMI 32.12 kg/m²      Physical Exam   Constitutional: She is oriented to person, place, and time. She appears well-developed and well-nourished. Cardiovascular: Normal rate, regular rhythm, normal heart sounds and intact distal pulses. Pulmonary/Chest: Effort normal.   Abdominal: Soft. Bowel sounds are normal.   Genitourinary: There is no rash, tenderness, lesion or injury on the right labia.  There is no

## 2019-04-16 ENCOUNTER — TELEPHONE (OUTPATIENT)
Dept: FAMILY MEDICINE CLINIC | Age: 84
End: 2019-04-16

## 2020-06-09 ENCOUNTER — TELEPHONE (OUTPATIENT)
Dept: FAMILY MEDICINE CLINIC | Age: 85
End: 2020-06-09

## 2020-06-09 NOTE — TELEPHONE ENCOUNTER
Wang Moore was contacted to set up a video visit with Angeles Starkey MD.     Mihaela Deluca with: Left message for patient to give office a call. Patient is due for a htn f/u.          Sabiha Hopkins

## 2020-11-24 ENCOUNTER — TELEPHONE (OUTPATIENT)
Dept: FAMILY MEDICINE CLINIC | Age: 85
End: 2020-11-24

## 2020-11-24 NOTE — TELEPHONE ENCOUNTER
Scheduling outreach call to review Health Maintenance and / or schedule appointment.     AWV  due  Colonoscopy   [address] GAP YES  Lab work CMP-TSH  Mammogram   PHQ-9 no due to diagnosis  Last appointment 4- w/  *she was supposed to come in for a BP check-no documentation in chart  Upcoming appointment no  Scanned and updated HM yes    Spoke with daughter patient moved back to [place]

## 2021-09-08 ENCOUNTER — NURSE TRIAGE (OUTPATIENT)
Dept: OTHER | Facility: CLINIC | Age: 86
End: 2021-09-08

## 2021-09-08 NOTE — TELEPHONE ENCOUNTER
Received call from United Zamora at [facility] with Red Flag Complaint. Brief description of triage: left foot swollen and red, burning and itching    Triage indicates for patient to see HCP within 4 hrs    Care advice provided, patient verbalizes understanding; denies any other questions or concerns; instructed to call back for any new or worsening symptoms. Writer provided warm transfer to Malathi Soria at [facility] for appointment scheduling. Attention Provider: Thank you for allowing me to participate in the care of your patient. The patient was connected to triage in response to information provided to the [facility]. Please do not respond through this encounter as the response is not directed to a shared pool. Reason for Disposition   [1] Red area or streak [2] large (> 2 in. or 5 cm)    Answer Assessment - Initial Assessment Questions  1. ONSET: \"When did the swelling start? \" (e.g., minutes, hours, days)      4th day    2. LOCATION: \"What part of the leg is swollen? \"  \"Are both legs swollen or just one leg? \"      Red top of foot, looks bruised to ankle- left foot    3. SEVERITY: \"How bad is the swelling? \" (e.g., localized; mild, moderate, severe)   - Localized - small area of swelling localized to one leg   - MILD pedal edema - swelling limited to foot and ankle, pitting edema < 1/4 inch (6 mm) deep, rest and elevation eliminate most or all swelling   - MODERATE edema - swelling of lower leg to knee, pitting edema > 1/4 inch (6 mm) deep, rest and elevation only partially reduce swelling   - SEVERE edema - swelling extends above knee, facial or hand swelling present       Mild edema, also has this chronically    4. REDNESS: \"Does the swelling look red or infected? \"      Red    5. PAIN: \"Is the swelling painful to touch? \" If so, ask: \"How painful is it? \"   (Scale 1-10; mild, moderate or severe)      6/10    6. FEVER: \"Do you have a fever? \" If so, ask: \"What is it, how was it measured, and when did it start? \"       No    7. CAUSE: \"What do you think is causing the leg swelling? \"      Unknown, had car ride 4 hrs on Sunday    8. MEDICAL HISTORY: \"Do you have a history of heart failure, kidney disease, liver failure, or cancer? \"      CHF, Kidney and liver, had chronic UTI's and thinks in early stages of kidney failure    9. RECURRENT SYMPTOM: \"Have you had leg swelling before? \" If so, ask: \"When was the last time? \" \"What happened that time? \"      Yes    10. OTHER SYMPTOMS: \"Do you have any other symptoms? \" (e.g., chest pain, difficulty breathing)        Itching and burning    11. PREGNANCY: \"Is there any chance you are pregnant? \" \"When was your last menstrual period? \"        N/a    Protocols used: LEG SWELLING AND EDEMA-ADULT-

## 2021-11-10 ENCOUNTER — OFFICE VISIT (OUTPATIENT)
Dept: FAMILY MEDICINE CLINIC | Age: 86
End: 2021-11-10
Payer: MEDICARE

## 2021-11-10 VITALS
WEIGHT: 196 LBS | HEART RATE: 85 BPM | SYSTOLIC BLOOD PRESSURE: 136 MMHG | HEIGHT: 65 IN | TEMPERATURE: 98.3 F | RESPIRATION RATE: 15 BRPM | BODY MASS INDEX: 32.65 KG/M2 | OXYGEN SATURATION: 96 % | DIASTOLIC BLOOD PRESSURE: 74 MMHG

## 2021-11-10 DIAGNOSIS — Z23 ENCOUNTER FOR IMMUNIZATION: ICD-10-CM

## 2021-11-10 DIAGNOSIS — I10 PRIMARY HYPERTENSION: ICD-10-CM

## 2021-11-10 DIAGNOSIS — E55.9 VITAMIN D DEFICIENCY: ICD-10-CM

## 2021-11-10 DIAGNOSIS — E03.9 HYPOTHYROIDISM, UNSPECIFIED TYPE: ICD-10-CM

## 2021-11-10 DIAGNOSIS — Z87.440 HISTORY OF UTI: ICD-10-CM

## 2021-11-10 DIAGNOSIS — R01.1 HEART MURMUR: ICD-10-CM

## 2021-11-10 DIAGNOSIS — R29.898 WEAKNESS OF BOTH LOWER EXTREMITIES: Primary | ICD-10-CM

## 2021-11-10 LAB
BILIRUBIN URINE: NEGATIVE
BLOOD, URINE: NEGATIVE
CLARITY: CLEAR
COLOR: YELLOW
GLUCOSE URINE: NEGATIVE MG/DL
KETONES, URINE: ABNORMAL MG/DL
LEUKOCYTE ESTERASE, URINE: NEGATIVE
NITRITE, URINE: NEGATIVE
PH UA: 6.5 (ref 5–9)
PROTEIN UA: NEGATIVE MG/DL
SPECIFIC GRAVITY UA: 1.02 (ref 1–1.03)
URINE REFLEX TO CULTURE: ABNORMAL
UROBILINOGEN, URINE: 1 E.U./DL

## 2021-11-10 PROCEDURE — G8417 CALC BMI ABV UP PARAM F/U: HCPCS | Performed by: INTERNAL MEDICINE

## 2021-11-10 PROCEDURE — 93000 ELECTROCARDIOGRAM COMPLETE: CPT | Performed by: INTERNAL MEDICINE

## 2021-11-10 PROCEDURE — 1090F PRES/ABSN URINE INCON ASSESS: CPT | Performed by: INTERNAL MEDICINE

## 2021-11-10 PROCEDURE — 90694 VACC AIIV4 NO PRSRV 0.5ML IM: CPT | Performed by: INTERNAL MEDICINE

## 2021-11-10 PROCEDURE — 1123F ACP DISCUSS/DSCN MKR DOCD: CPT | Performed by: INTERNAL MEDICINE

## 2021-11-10 PROCEDURE — G8400 PT W/DXA NO RESULTS DOC: HCPCS | Performed by: INTERNAL MEDICINE

## 2021-11-10 PROCEDURE — 4040F PNEUMOC VAC/ADMIN/RCVD: CPT | Performed by: INTERNAL MEDICINE

## 2021-11-10 PROCEDURE — G8427 DOCREV CUR MEDS BY ELIG CLIN: HCPCS | Performed by: INTERNAL MEDICINE

## 2021-11-10 PROCEDURE — 1036F TOBACCO NON-USER: CPT | Performed by: INTERNAL MEDICINE

## 2021-11-10 PROCEDURE — G0008 ADMIN INFLUENZA VIRUS VAC: HCPCS | Performed by: INTERNAL MEDICINE

## 2021-11-10 PROCEDURE — 99214 OFFICE O/P EST MOD 30 MIN: CPT | Performed by: INTERNAL MEDICINE

## 2021-11-10 PROCEDURE — G8484 FLU IMMUNIZE NO ADMIN: HCPCS | Performed by: INTERNAL MEDICINE

## 2021-11-10 RX ORDER — ALBUTEROL SULFATE 90 UG/1
1 AEROSOL, METERED RESPIRATORY (INHALATION) EVERY 6 HOURS PRN
Qty: 18 G | Refills: 3 | Status: SHIPPED | OUTPATIENT
Start: 2021-11-10

## 2021-11-10 RX ORDER — GABAPENTIN 100 MG/1
100 CAPSULE ORAL EVERY 12 HOURS PRN
Qty: 14 CAPSULE | Refills: 0 | Status: SHIPPED | OUTPATIENT
Start: 2021-11-10 | End: 2021-12-10 | Stop reason: CLARIF

## 2021-11-10 RX ORDER — LOSARTAN POTASSIUM 50 MG/1
50 TABLET ORAL DAILY
COMMUNITY
End: 2022-06-02 | Stop reason: SDUPTHER

## 2021-11-10 SDOH — ECONOMIC STABILITY: FOOD INSECURITY: WITHIN THE PAST 12 MONTHS, YOU WORRIED THAT YOUR FOOD WOULD RUN OUT BEFORE YOU GOT MONEY TO BUY MORE.: NEVER TRUE

## 2021-11-10 SDOH — ECONOMIC STABILITY: FOOD INSECURITY: WITHIN THE PAST 12 MONTHS, THE FOOD YOU BOUGHT JUST DIDN'T LAST AND YOU DIDN'T HAVE MONEY TO GET MORE.: NEVER TRUE

## 2021-11-10 ASSESSMENT — ENCOUNTER SYMPTOMS
SHORTNESS OF BREATH: 0
BACK PAIN: 0
EYE PAIN: 0
ABDOMINAL PAIN: 0

## 2021-11-10 ASSESSMENT — SOCIAL DETERMINANTS OF HEALTH (SDOH): HOW HARD IS IT FOR YOU TO PAY FOR THE VERY BASICS LIKE FOOD, HOUSING, MEDICAL CARE, AND HEATING?: NOT HARD AT ALL

## 2021-11-10 NOTE — PROGRESS NOTES
Subjective:      Patient ID: Duyen Dunlap is a 82 y.o. 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 Social History     Socioeconomic History    Marital status:      Spouse name: Not on file    Number of children: Not on file    Years of education: Not on file    Highest education level: Not on file   Occupational History    Not on file   Tobacco Use    Smoking status: Never Smoker    Smokeless tobacco: Never Used   Substance and Sexual Activity    Alcohol use: No    Drug use: No    Sexual activity: Not Currently   Other Topics Concern    Not on file   Social History Narrative    Not on file     Social Determinants of Health     Financial Resource Strain: Low Risk     Difficulty of Paying Living Expenses: Not hard at all   Food Insecurity: No Food Insecurity    Worried About [address] [employer] in the Last Year: Never true    [address] in the Last Year: Never true   Transportation Needs:     Lack of Transportation (Medical): Not on file    Lack of Transportation (Non-Medical):  Not on file   Physical Activity:     Days of Exercise per Week: Not on file    Minutes of Exercise per Session: Not on file   Stress:     Feeling of Stress : Not on file   Social Connections:     Frequency of Communication with Friends and Family: Not on file    Frequency of Social Gatherings with Friends and Family: Not on file    Attends Gnosticism Services: Not on file    Active Member of Clubs or Organizations: Not on file    Attends Club or Organization Meetings: Not on file    Marital Status: Not on file   Intimate Partner Violence:     Fear of Current or Ex-Partner: Not on file    Emotionally Abused: Not on file    Physically Abused: Not on file    Sexually Abused: Not on file   Housing Stability:     Unable to Pay for Housing in the Last Year: Not on file    Number of Places Lived in the Last Year: Not on file    Unstable Housing in the Last Year: Not on file     Allergies   Allergen Reactions    Azithromycin Nausea And Vomiting    Ceftin  [Cefuroxime Axetil] Nausea And Vomiting     Current Outpatient Medications on File Prior to Visit   Medication Sig Dispense Refill    losartan (COZAAR) 50 MG tablet Take 50 mg by mouth daily      rOPINIRole (REQUIP) 0.5 MG tablet ropinirole 0.5 mg tablet 90 tablet 1    levothyroxine (SYNTHROID) 88 MCG tablet Synthroid 88 mcg tablet 90 tablet 1    EQ ALLERGY RELIEF 180 MG tablet TAKE 1 TABLET BY MOUTH ONCE DAILY 90 tablet 1    amitriptyline (ELAVIL) 10 MG tablet amitriptyline 10 mg tablet   TAKE TWO TABLETS BY MOUTH ONCE DAILY AS NEEDED      Sucralfate (CARAFATE PO) Carafate      furosemide (LASIX) 20 MG tablet Lasix 20 mg tablet   TAKE 1 TABLET BY MOUTH ONCE DAILY AS NEEDED      esomeprazole (NEXIUM) 40 MG delayed release capsule Take 1 capsule po 30 minutes before your largest meal      tiotropium (SPIRIVA RESPIMAT) 2.5 MCG/ACT AERS inhaler Inhale 2 puffs into the lungs daily (Patient not taking: Reported on 11/10/2021) 1 Inhaler 2     No current facility-administered medications on file prior to visit. I have personally reviewed the ROS, PMH, PFH, and social history     Review of Systems   Constitutional: Negative for chills and fever. HENT: Negative for congestion. Eyes: Negative for pain. Respiratory: Negative for shortness of breath. Cardiovascular: Negative for chest pain. Gastrointestinal: Negative for abdominal pain. Genitourinary: Negative for hematuria. Musculoskeletal: Negative for back pain. Allergic/Immunologic: Negative for immunocompromised state.    Neurological: Negative for headaches. Psychiatric/Behavioral: Negative for hallucinations. Objective:   /74 (Site: Left Upper Arm, Position: Sitting, Cuff Size: Large Adult)   Pulse 85   Temp 98.3 °F (36.8 °C) (Temporal)   Resp 15   Ht 5' 5\" (1.651 m)   Wt 196 lb (88.9 kg)   SpO2 96%   BMI 32.62 kg/m²     Physical Exam  Constitutional:       General: She is not in acute distress. Appearance: Normal appearance. She is not ill-appearing, toxic-appearing or diaphoretic. HENT:      Head: Normocephalic. Neck:      Vascular: No carotid bruit. Cardiovascular:      Rate and Rhythm: Normal rate and regular rhythm. Pulses: Normal pulses. Heart sounds: Murmur heard. No friction rub. No gallop. Pulmonary:      Effort: Pulmonary effort is normal. No respiratory distress. Breath sounds: Normal breath sounds. No wheezing, rhonchi or rales. Abdominal:      General: Abdomen is flat. There is no distension. Palpations: Abdomen is soft. Tenderness: There is no abdominal tenderness. There is no right CVA tenderness, left CVA tenderness, guarding or rebound. Musculoskeletal:      Cervical back: Neck supple. Right lower leg: No edema. Left lower leg: No edema. Comments: No saddle anesthesia  5/5 muscle strength in bl hip ext/flexors, knee flex/extensors, and plantar and dorsiflexion  Negative Straight leg test  Sensation intact to soft touch and prick   No point tenderness over spinous processes. No palpable step off   Negative babinski  No clonus   No erythema, ballottement, warmth, or swelling over patella. (BL)   No pain with palpitation of MCL or LCL  Negative anterior and posterior lachman test  Negative mccmury test   Negative patellar grind test    No erythema over bl hips  No pain to palpitation over bl hips    Skin:     General: Skin is warm. Findings: No erythema or rash. Neurological:      Mental Status: She is alert.    Psychiatric:         Mood and Affect: Mood normal.           Assessment:       Diagnosis Orders   1. Weakness of both lower extremities  XR KNEE RIGHT (3 VIEWS)    XR KNEE LEFT (3 VIEWS)    MRI LUMBAR SPINE WO CONTRAST    XR LUMBAR SPINE (MIN 4 VIEWS)    XR HIP BILATERAL W AP PELVIS (2 VIEWS)    Marcela Perdue MD, Neurosurgery, [place]    TSH with Reflex    Vitamin D 25 Hydroxy    CBC Auto Differential    Comprehensive Metabolic Panel    Lipid Panel    Vitamin B12 & Folate    Vitamin B1    gabapentin (NEURONTIN) 100 MG capsule   2. History of UTI  Urine Reflex to Culture    TSH with Reflex    Vitamin D 25 Hydroxy    CBC Auto Differential    Comprehensive Metabolic Panel    Lipid Panel    Vitamin B12 & Folate    Vitamin B1   3. Encounter for immunization  INFLUENZA, QUADV, ADJUVANTED, 72 YRS =, IM, PF, PREFILL SYR, 0.5ML (FLUAD)    TSH with Reflex    Vitamin D 25 Hydroxy    CBC Auto Differential    Comprehensive Metabolic Panel    Lipid Panel    Vitamin B12 & Folate    Vitamin B1   4. Vitamin D deficiency  TSH with Reflex    Vitamin D 25 Hydroxy    CBC Auto Differential    Comprehensive Metabolic Panel    Lipid Panel    Vitamin B12 & Folate    Vitamin B1   5. Primary hypertension  EKG 12 lead    TSH with Reflex    Vitamin D 25 Hydroxy    CBC Auto Differential    Comprehensive Metabolic Panel    Lipid Panel    Vitamin B12 & Folate    Vitamin B1   6. Hypothyroidism, unspecified type  TSH with Reflex    Vitamin D 25 Hydroxy    CBC Auto Differential    Comprehensive Metabolic Panel    Lipid Panel    Vitamin B12 & Folate    Vitamin B1   7.  Heart murmur  ECHO Complete 2D W Doppler W Color         Plan:     vc  MRI (not completed )  Neurosurgery Did not do this    had covid vaccine x2, 2021   See orders                   Orders Placed This Encounter   Procedures    XR KNEE RIGHT (3 VIEWS)     Standing Status:   Future     Standing Expiration Date:   11/10/2022    XR KNEE LEFT (3 VIEWS)     Standing Status:   Future     Standing Expiration Date:   11/10/2022    MRI LUMBAR SPINE WO CONTRAST     Standing Status:   Future     Standing Expiration Date:   11/10/2022    XR LUMBAR SPINE (MIN 4 VIEWS)     Standing Status:   Future     Standing Expiration Date:   11/10/2022    XR HIP BILATERAL W AP PELVIS (2 VIEWS)     Standing Status:   Future     Standing Expiration Date:   11/10/2022    INFLUENZA, QUADV, ADJUVANTED, 72 YRS =, IM, PF, PREFILL SYR, 0.5ML (FLUAD)    Urine Reflex to Culture     Standing Status:   Future     Standing Expiration Date:   11/10/2022     Order Specific Question:   SPECIFY(EX-CATH,MIDSTREAM,CYSTO,ETC)? Answer:   MID STREAM    TSH with Reflex     Standing Status:   Future     Standing Expiration Date:   11/10/2022    Vitamin D 25 Hydroxy     Standing Status:   Future     Standing Expiration Date:   11/10/2022    CBC Auto Differential     Standing Status:   Future     Standing Expiration Date:   11/10/2022    Comprehensive Metabolic Panel     Standing Status:   Future     Standing Expiration Date:   11/10/2022    Lipid Panel     Standing Status:   Future     Standing Expiration Date:   11/10/2022     Order Specific Question:   Is Patient Fasting?/# of Hours     Answer:   10    Vitamin B12 & Folate     Standing Status:   Future     Standing Expiration Date:   11/10/2022    Vitamin B1     Standing Status:   Future     Standing Expiration Date:   11/10/2022   Maegan Hopkins MD, Neurosurgery, [place]     Referral Priority:   Routine     Referral Type:   Eval and Treat     Referral Reason:   Specialty Services Required     Referred to Provider:   Berwyn Cushing, MD     Requested Specialty:   Neurosurgery     Number of Visits Requested:   1    EKG 12 lead     Standing Status:   Future     Standing Expiration Date:   1/9/2022     Order Specific Question:   Reason for Exam?     Answer:    Other    ECHO Complete 2D W Doppler W Color     Standing Status:   Future     Standing Expiration Date:   11/10/2022     Order Specific Question:   Reason for exam:     Answer:   Akash Hollins     Orders Placed This Encounter   Medications    albuterol sulfate  (90 Base) MCG/ACT inhaler     Sig: Inhale 1 puff into the lungs every 6 hours as needed for Wheezing or Shortness of Breath     Dispense:  18 g     Refill:  3    gabapentin (NEURONTIN) 100 MG capsule     Sig: Take 1 capsule by mouth every 12 hours as needed (leg or back pain) for up to 7 days. Intended supply: 7 days     Dispense:  14 capsule     Refill:  0   No urinary incontinence, no urinary retention, no fevers, no chills, no numbness in or around groin, no weakness. Refusing pt due to pain   Risk of permament damage explained. Risks of gabapentin explained (depression, lethargy, change in mental status, etc call asap should anything change)   If anything should change or worsen call ASAP, don't wait for next scheduled appointment. Return in about 4 weeks (around 12/8/2021) for Chronic condition management/appointment, worsening symptoms, call ASAP for appointment.       Beverley Diane MD

## 2021-11-10 NOTE — PROGRESS NOTES
Vaccine Information Sheet, \"Influenza - Inactivated\"  given to Prisca Orellana, or parent/legal guardian of  Pricsa Orellana and verbalized understanding. Patient responses:    Have you ever had a reaction to a flu vaccine? No  Are you able to eat eggs without adverse effects? Yes  Do you have any current illness? No  Have you ever had Guillian Aredale Syndrome? No    Flu vaccine given per order. Please see immunization tab.

## 2021-11-12 DIAGNOSIS — I10 PRIMARY HYPERTENSION: ICD-10-CM

## 2021-11-12 DIAGNOSIS — E55.9 VITAMIN D DEFICIENCY: ICD-10-CM

## 2021-11-12 DIAGNOSIS — Z23 ENCOUNTER FOR IMMUNIZATION: ICD-10-CM

## 2021-11-12 DIAGNOSIS — E03.9 HYPOTHYROIDISM, UNSPECIFIED TYPE: ICD-10-CM

## 2021-11-12 DIAGNOSIS — R29.898 WEAKNESS OF BOTH LOWER EXTREMITIES: ICD-10-CM

## 2021-11-12 DIAGNOSIS — Z87.440 HISTORY OF UTI: ICD-10-CM

## 2021-11-12 LAB
ALBUMIN SERPL-MCNC: 4.5 G/DL (ref 3.5–4.6)
ALP BLD-CCNC: 93 U/L (ref 40–130)
ALT SERPL-CCNC: 13 U/L (ref 0–33)
ANION GAP SERPL CALCULATED.3IONS-SCNC: 12 MEQ/L (ref 9–15)
AST SERPL-CCNC: 22 U/L (ref 0–35)
BASOPHILS ABSOLUTE: 0.1 K/UL (ref 0–0.2)
BASOPHILS RELATIVE PERCENT: 1.2 %
BILIRUB SERPL-MCNC: 0.3 MG/DL (ref 0.2–0.7)
BUN BLDV-MCNC: 15 MG/DL (ref 8–23)
CALCIUM SERPL-MCNC: 9.1 MG/DL (ref 8.5–9.9)
CHLORIDE BLD-SCNC: 104 MEQ/L (ref 95–107)
CHOLESTEROL, TOTAL: 226 MG/DL (ref 0–199)
CO2: 26 MEQ/L (ref 20–31)
CREAT SERPL-MCNC: 0.75 MG/DL (ref 0.5–0.9)
EOSINOPHILS ABSOLUTE: 0.2 K/UL (ref 0–0.7)
EOSINOPHILS RELATIVE PERCENT: 3.8 %
FOLATE: 6.5 NG/ML (ref 7.3–26.1)
GFR AFRICAN AMERICAN: >60
GFR NON-AFRICAN AMERICAN: >60
GLOBULIN: 2.7 G/DL (ref 2.3–3.5)
GLUCOSE BLD-MCNC: 81 MG/DL (ref 70–99)
HCT VFR BLD CALC: 39.8 % (ref 37–47)
HDLC SERPL-MCNC: 56 MG/DL (ref 40–59)
HEMOGLOBIN: 12.7 G/DL (ref 12–16)
LDL CHOLESTEROL CALCULATED: 141 MG/DL (ref 0–129)
LYMPHOCYTES ABSOLUTE: 2.5 K/UL (ref 1–4.8)
LYMPHOCYTES RELATIVE PERCENT: 42.9 %
MCH RBC QN AUTO: 27.3 PG (ref 27–31.3)
MCHC RBC AUTO-ENTMCNC: 31.9 % (ref 33–37)
MCV RBC AUTO: 85.4 FL (ref 82–100)
MONOCYTES ABSOLUTE: 0.6 K/UL (ref 0.2–0.8)
MONOCYTES RELATIVE PERCENT: 9.7 %
NEUTROPHILS ABSOLUTE: 2.4 K/UL (ref 1.4–6.5)
NEUTROPHILS RELATIVE PERCENT: 42.4 %
PDW BLD-RTO: 14.5 % (ref 11.5–14.5)
PLATELET # BLD: 217 K/UL (ref 130–400)
POTASSIUM SERPL-SCNC: 4.7 MEQ/L (ref 3.4–4.9)
RBC # BLD: 4.66 M/UL (ref 4.2–5.4)
SODIUM BLD-SCNC: 142 MEQ/L (ref 135–144)
T4 FREE: 0.88 NG/DL (ref 0.84–1.68)
TOTAL PROTEIN: 7.2 G/DL (ref 6.3–8)
TRIGL SERPL-MCNC: 145 MG/DL (ref 0–150)
TSH REFLEX: 18.79 UIU/ML (ref 0.44–3.86)
VITAMIN B-12: 176 PG/ML (ref 232–1245)
VITAMIN D 25-HYDROXY: 21 NG/ML (ref 30–100)
WBC # BLD: 5.8 K/UL (ref 4.8–10.8)

## 2021-12-10 ENCOUNTER — OFFICE VISIT (OUTPATIENT)
Dept: FAMILY MEDICINE CLINIC | Age: 86
End: 2021-12-10
Payer: MEDICARE

## 2021-12-10 ENCOUNTER — INITIAL CONSULT (OUTPATIENT)
Dept: PAIN MANAGEMENT | Age: 86
End: 2021-12-10
Payer: MEDICARE

## 2021-12-10 VITALS
HEART RATE: 61 BPM | BODY MASS INDEX: 32.65 KG/M2 | RESPIRATION RATE: 15 BRPM | TEMPERATURE: 97.8 F | HEIGHT: 65 IN | OXYGEN SATURATION: 97 % | DIASTOLIC BLOOD PRESSURE: 83 MMHG | SYSTOLIC BLOOD PRESSURE: 134 MMHG | WEIGHT: 196 LBS

## 2021-12-10 VITALS
SYSTOLIC BLOOD PRESSURE: 142 MMHG | HEIGHT: 65 IN | DIASTOLIC BLOOD PRESSURE: 82 MMHG | TEMPERATURE: 96.7 F | BODY MASS INDEX: 32.65 KG/M2 | WEIGHT: 196 LBS

## 2021-12-10 DIAGNOSIS — M25.562 CHRONIC PAIN OF BOTH KNEES: ICD-10-CM

## 2021-12-10 DIAGNOSIS — E53.8 VITAMIN B12 DEFICIENCY: ICD-10-CM

## 2021-12-10 DIAGNOSIS — E53.8 FOLIC ACID DEFICIENCY: ICD-10-CM

## 2021-12-10 DIAGNOSIS — Z12.31 ENCOUNTER FOR SCREENING MAMMOGRAM FOR MALIGNANT NEOPLASM OF BREAST: ICD-10-CM

## 2021-12-10 DIAGNOSIS — J43.2 CENTRILOBULAR EMPHYSEMA (HCC): ICD-10-CM

## 2021-12-10 DIAGNOSIS — G62.9 NEUROPATHY: ICD-10-CM

## 2021-12-10 DIAGNOSIS — F32.1 MODERATE MAJOR DEPRESSION (HCC): ICD-10-CM

## 2021-12-10 DIAGNOSIS — M17.0 BILATERAL PRIMARY OSTEOARTHRITIS OF KNEE: Primary | ICD-10-CM

## 2021-12-10 DIAGNOSIS — F11.90 CHRONIC, CONTINUOUS USE OF OPIOIDS: ICD-10-CM

## 2021-12-10 DIAGNOSIS — G89.29 CHRONIC PAIN OF BOTH KNEES: ICD-10-CM

## 2021-12-10 DIAGNOSIS — Z51.81 ENCOUNTER FOR MONITORING OPIOID MAINTENANCE THERAPY: ICD-10-CM

## 2021-12-10 DIAGNOSIS — E78.5 HYPERLIPIDEMIA, UNSPECIFIED HYPERLIPIDEMIA TYPE: ICD-10-CM

## 2021-12-10 DIAGNOSIS — M25.561 CHRONIC PAIN OF BOTH KNEES: ICD-10-CM

## 2021-12-10 DIAGNOSIS — Z79.891 ENCOUNTER FOR MONITORING OPIOID MAINTENANCE THERAPY: ICD-10-CM

## 2021-12-10 DIAGNOSIS — E03.9 HYPOTHYROIDISM, UNSPECIFIED TYPE: ICD-10-CM

## 2021-12-10 DIAGNOSIS — G25.81 RESTLESS LEG SYNDROME: Primary | ICD-10-CM

## 2021-12-10 DIAGNOSIS — R29.898 WEAKNESS OF BOTH LOWER EXTREMITIES: ICD-10-CM

## 2021-12-10 DIAGNOSIS — Z12.11 SCREENING FOR COLON CANCER: ICD-10-CM

## 2021-12-10 LAB
6-ACETYLMORPHINE, UR: NORMAL
AMPHETAMINE SCREEN, URINE: NORMAL
BARBITURATE SCREEN, URINE: NORMAL
BENZODIAZEPINE SCREEN, URINE: NORMAL
CANNABINOID SCREEN URINE: NORMAL
COCAINE METABOLITE, URINE: NORMAL
CREATININE URINE: NORMAL
EDDP, URINE: NORMAL
ETHANOL URINE: NORMAL
MDMA URINE: NORMAL
METHADONE SCREEN, URINE: NORMAL
METHAMPHETAMINE, URINE: NORMAL
OPIATES, URINE: NORMAL
OXYCODONE: NORMAL
PCP: NORMAL
PH, URINE: NORMAL
PHENCYCLIDINE, URINE: NORMAL
PROPOXYPHENE, URINE: NORMAL
TRICYCLIC ANTIDEPRESSANTS, UR: NORMAL

## 2021-12-10 PROCEDURE — 1036F TOBACCO NON-USER: CPT | Performed by: INTERNAL MEDICINE

## 2021-12-10 PROCEDURE — 99215 OFFICE O/P EST HI 40 MIN: CPT | Performed by: INTERNAL MEDICINE

## 2021-12-10 PROCEDURE — G8417 CALC BMI ABV UP PARAM F/U: HCPCS | Performed by: PHYSICAL MEDICINE & REHABILITATION

## 2021-12-10 PROCEDURE — 99204 OFFICE O/P NEW MOD 45 MIN: CPT | Performed by: PHYSICAL MEDICINE & REHABILITATION

## 2021-12-10 PROCEDURE — 4040F PNEUMOC VAC/ADMIN/RCVD: CPT | Performed by: INTERNAL MEDICINE

## 2021-12-10 PROCEDURE — G8484 FLU IMMUNIZE NO ADMIN: HCPCS | Performed by: PHYSICAL MEDICINE & REHABILITATION

## 2021-12-10 PROCEDURE — 1123F ACP DISCUSS/DSCN MKR DOCD: CPT | Performed by: PHYSICAL MEDICINE & REHABILITATION

## 2021-12-10 PROCEDURE — G8427 DOCREV CUR MEDS BY ELIG CLIN: HCPCS | Performed by: PHYSICAL MEDICINE & REHABILITATION

## 2021-12-10 PROCEDURE — 3023F SPIROM DOC REV: CPT | Performed by: INTERNAL MEDICINE

## 2021-12-10 PROCEDURE — 1123F ACP DISCUSS/DSCN MKR DOCD: CPT | Performed by: INTERNAL MEDICINE

## 2021-12-10 PROCEDURE — 1090F PRES/ABSN URINE INCON ASSESS: CPT | Performed by: INTERNAL MEDICINE

## 2021-12-10 PROCEDURE — 1036F TOBACCO NON-USER: CPT | Performed by: PHYSICAL MEDICINE & REHABILITATION

## 2021-12-10 PROCEDURE — G8427 DOCREV CUR MEDS BY ELIG CLIN: HCPCS | Performed by: INTERNAL MEDICINE

## 2021-12-10 PROCEDURE — 1090F PRES/ABSN URINE INCON ASSESS: CPT | Performed by: PHYSICAL MEDICINE & REHABILITATION

## 2021-12-10 PROCEDURE — G8417 CALC BMI ABV UP PARAM F/U: HCPCS | Performed by: INTERNAL MEDICINE

## 2021-12-10 PROCEDURE — G8926 SPIRO NO PERF OR DOC: HCPCS | Performed by: INTERNAL MEDICINE

## 2021-12-10 PROCEDURE — 4040F PNEUMOC VAC/ADMIN/RCVD: CPT | Performed by: PHYSICAL MEDICINE & REHABILITATION

## 2021-12-10 PROCEDURE — G8400 PT W/DXA NO RESULTS DOC: HCPCS | Performed by: INTERNAL MEDICINE

## 2021-12-10 PROCEDURE — G8400 PT W/DXA NO RESULTS DOC: HCPCS | Performed by: PHYSICAL MEDICINE & REHABILITATION

## 2021-12-10 PROCEDURE — G8484 FLU IMMUNIZE NO ADMIN: HCPCS | Performed by: INTERNAL MEDICINE

## 2021-12-10 RX ORDER — LIDOCAINE 40 MG/G
CREAM TOPICAL
Qty: 45 G | Refills: 1 | Status: SHIPPED | OUTPATIENT
Start: 2021-12-10 | End: 2022-08-23 | Stop reason: SDUPTHER

## 2021-12-10 RX ORDER — GABAPENTIN 100 MG/1
100 CAPSULE ORAL EVERY 12 HOURS PRN
Qty: 14 CAPSULE | Refills: 0 | Status: CANCELLED | OUTPATIENT
Start: 2021-12-10 | End: 2021-12-17

## 2021-12-10 RX ORDER — HYDROCODONE BITARTRATE AND ACETAMINOPHEN 5; 325 MG/1; MG/1
1 TABLET ORAL DAILY PRN
Qty: 30 TABLET | Refills: 0 | Status: SHIPPED | OUTPATIENT
Start: 2021-12-10 | End: 2022-03-11 | Stop reason: SDUPTHER

## 2021-12-10 RX ORDER — MULTIVIT-MIN/IRON/FOLIC ACID/K 18-600-40
CAPSULE ORAL DAILY
COMMUNITY

## 2021-12-10 RX ORDER — FOLIC ACID 1 MG/1
1 TABLET ORAL DAILY
Qty: 90 TABLET | Refills: 3 | Status: SHIPPED | OUTPATIENT
Start: 2021-12-10

## 2021-12-10 RX ORDER — ROPINIROLE 0.5 MG/1
TABLET, FILM COATED ORAL
Qty: 90 TABLET | Refills: 3 | Status: SHIPPED | OUTPATIENT
Start: 2021-12-10

## 2021-12-10 RX ORDER — NALOXONE HYDROCHLORIDE 4 MG/.1ML
1 SPRAY NASAL PRN
Qty: 1 EACH | Refills: 0 | Status: SHIPPED | OUTPATIENT
Start: 2021-12-10

## 2021-12-10 RX ORDER — AMITRIPTYLINE HYDROCHLORIDE 10 MG/1
TABLET, FILM COATED ORAL
Qty: 180 TABLET | Refills: 3 | Status: SHIPPED | OUTPATIENT
Start: 2021-12-10

## 2021-12-10 RX ORDER — LEVOTHYROXINE SODIUM 88 MCG
TABLET ORAL
Qty: 90 TABLET | Refills: 3 | Status: SHIPPED | OUTPATIENT
Start: 2021-12-10 | End: 2022-06-21 | Stop reason: DRUGHIGH

## 2021-12-10 ASSESSMENT — ENCOUNTER SYMPTOMS
DIARRHEA: 0
NAUSEA: 0
ABDOMINAL PAIN: 0
BACK PAIN: 1
BACK PAIN: 0
EYE PAIN: 0
SHORTNESS OF BREATH: 0
SHORTNESS OF BREATH: 0
CONSTIPATION: 0

## 2021-12-10 NOTE — PROGRESS NOTES
Bertha Nieves  (1935)    12/10/2021    Subjective:     Bertha Nieves is 82 y.o. female who complains today of:    Chief Complaint   Patient presents with    Leg Pain     Bilateral Leg Pain       Bertha Nieves is a 82 y.o. female who presents for evaluation by request of Dr. Liv Dominguez for lower extremity weakness. She has struggled with pain for \"80 years. \" She denies any immediately-preceding traumatic or inciting events. She has been previously evaluated by Dr Sudha Vaz whose records are reviewed below. She describes pain located in both knees. Pain is a constant ache and is currently a 4/10 and gets up to a 8/10 at its worst and goes down to a 4/10 at its best. Pain is worse with standing and putting weight on her feet. Pain is better with sitting down. Pain is located 40% on the right and 60% on the left. Pain is located 10% in the back and 90% in the knees. She denies any numbness, tingling, weakness, bowel or bladder dysfunction, saddle anesthesia, falls, history of cancer, unexplained weight loss, persistent night pain and sweats, fever, IV drug abuse, immunocompromise, chronic prednisone or antibiotic use, or any other red flag symptoms. Mood is good, denies any suicidal or homicidal ideation. Sleep is fair, awakes refreshed. 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 She used to enjoy riding horses.        Allergies:  Azithromycin and Ceftin  [cefuroxime axetil]    Past Medical History:   Diagnosis Date    Arthritis     Arthritis of both knees     Chronic back pain     COPD (chronic obstructive pulmonary disease) (HCC)     Depression     Deviated septum     per patient     Emphysema (subcutaneous) (surgical) resulting from a procedure     Fibromyalgia     Irritable bowel syndrome     Lyme arthritis ([facility] [address]     Neuropathy     Pregnancy, GI problems     Pulmonary fibrosis (HCC)     per patient     Restless leg syndrome     Spinal stenosis     Urinary incontinence      History reviewed. No pertinent surgical history. Family History   Problem Relation Age of Onset    High Cholesterol Mother     Arthritis Mother     High Cholesterol Father     Heart Failure Father     Arthritis Father     Heart Disease Father     High Cholesterol Sister     Heart Failure Sister     Heart Failure Maternal Grandmother     High Cholesterol Maternal Grandmother     Heart Failure Paternal Grandfather     High Cholesterol Paternal Grandfather      Social History     Socioeconomic History    Marital status:      Spouse name: Not on file    Number of children: Not on file    Years of education: Not on file    Highest education level: Not on file   Occupational History    Not on file   Tobacco Use    Smoking status: Never Smoker    Smokeless tobacco: Never Used   Substance and Sexual Activity    Alcohol use: No    Drug use: No    Sexual activity: Not Currently   Other Topics Concern    Not on file   Social History Narrative    Not on file     Social Determinants of Health     Financial Resource Strain: Low Risk     Difficulty of Paying Living Expenses: Not hard at all   Food Insecurity: No Food Insecurity    Worried About Running Out of Food in the Last Year: Never true    [place] of Food in the Last Year: Never true   Transportation Needs:     Lack of Transportation (Medical): Not on file    Lack of Transportation (Non-Medical):  Not on file   Physical Activity:  Days of Exercise per Week: Not on file    Minutes of Exercise per Session: Not on file   Stress:     Feeling of Stress : Not on file   Social Connections:     Frequency of Communication with Friends and Family: Not on file    Frequency of Social Gatherings with Friends and Family: Not on file    Attends Quaker Services: Not on file    Active Member of [address] [place] [address] or Organizations: Not on file    Attends Club or Organization Meetings: Not on file    Marital Status: Not on file   Intimate Partner Violence:     Fear of Current or Ex-Partner: Not on file    Emotionally Abused: Not on file    Physically Abused: Not on file    Sexually Abused: Not on file   Housing Stability:     Unable to Pay for Housing in the Last Year: Not on file    Number of [place] in the Last Year: Not on file    Unstable Housing in the Last Year: Not on file       Current Outpatient Medications on File Prior to Visit   Medication Sig Dispense Refill    rOPINIRole (REQUIP) 0.5 MG tablet Take one tablet po qhs 90 tablet 3    amitriptyline (ELAVIL) 10 MG tablet TAKE TWO TABLETS BY MOUTH ONCE DAILY AT NIGHT 180 tablet 3    Cholecalciferol (VITAMIN D) 50 MCG (2000 UT) CAPS capsule Take by mouth daily      folic acid (FOLVITE) 1 MG tablet Take 1 tablet by mouth daily 90 tablet 3    cyanocobalamin ([employer] VITAMIN B12) 1000 MCG tablet Take 1 tablet by mouth daily 90 tablet 3    SYNTHROID 88 MCG tablet Take one tablet po once daily 90 tablet 3    losartan (COZAAR) 50 MG tablet Take 50 mg by mouth daily      albuterol sulfate  (90 Base) MCG/ACT inhaler Inhale 1 puff into the lungs every 6 hours as needed for Wheezing or Shortness of Breath 18 g 3    tiotropium (SPIRIVA RESPIMAT) 2.5 MCG/ACT AERS inhaler Inhale 2 puffs into the lungs daily 1 Inhaler 2    EQ ALLERGY RELIEF 180 MG tablet TAKE 1 TABLET BY MOUTH ONCE DAILY 90 tablet 1    Sucralfate (CARAFATE PO) Carafate      furosemide (LASIX) 20 MG tablet Lasix 20 mg tablet   TAKE 1 TABLET BY MOUTH ONCE DAILY AS NEEDED      esomeprazole (NEXIUM) 40 MG delayed release capsule Take 1 capsule po 30 minutes before your largest meal       No current facility-administered medications on file prior to visit. Review of Systems   Constitutional: Negative for fever. HENT: Negative for hearing loss. Respiratory: Negative for shortness of breath. Gastrointestinal: Negative for constipation, diarrhea and nausea. Genitourinary: Negative for difficulty urinating. Musculoskeletal: Positive for back pain. Negative for neck pain. Skin: Negative for rash. Neurological: Negative for headaches. Hematological: Does not bruise/bleed easily. Psychiatric/Behavioral: Negative for sleep disturbance. Objective:     Vitals:  BP (!) 142/82 (Site: Right Lower Arm)   Temp 96.7 °F (35.9 °C) (Infrared)   Ht 5' 5\" (1.651 m)   Wt 196 lb (88.9 kg)   BMI 32.62 kg/m² Pain Score:   4      Exam performed under Coronavirus precautions  Gen: No acute distress  Neck: Grossly symmetric without any significant thyromegaly or masses appreciated. Eyes: No scleral icterus or lid lag appreciated bilaterally. Irises without gross defects bilaterally. HEENT: Hearing impaired bilaterally. Normocephalic, external ears and visible portions of nose and mouth atraumatic. Lymph: No gross neck or axillary lymphadenopathy  Cardio: No significant lower extremity edema, pulses intact without significant digit ischemia. Abd: No gross masses or large hernias appreciated. Skin: Visualized skin without any dermatomal rashes or sores. Palpation free of any tightening or subcutaneous nodules. MSK: Gait deferred, in manual wheelchair. No significant upper limb digit ischemia appreciated. Psych: Pleasant and cooperative with the history and exam. Mood and Affect normal. Appropriately dressed with good eye contact. Judgement and insight normal. Recent and remote memory intact.  Alert and Oriented x3.  Neuro: Cranial nerves II-XII grossly intact. No significant pathologic reflexes appreciated. Gait deferred, in manual wheelchair    Sensation grossly intact in both legs. Reflexes and strength functional for ambulation, no abnormal reflexes appreciated on exam today  Strength greater than 3/5 bilateral legs  Straight leg raise negative bilaterally. Tender bilateral knee medial joint lines with valgus instability bilaterally on exam today          Outside record review:  Review of the original consultation request reveals no specific diagnostic requests or clinical concerns aside from low extremity weakness that require particular attention. There are no suggested, requested, or specified tests to be ordered or any prior diagnostics performed that require follow-up or further investigation. Dr. Rasheed Preciado 11/10/2021: New to provider. MRI not completed refusing physical therapy due to pain. XR bilateral hips 11/10/2021: Lumbar spine degenerative changes, SI joints intact, no fractures. Mild bilateral degenerative changes of the hips. XR L-spine 11/10/2021: 5 lumbar vertebrae. No fractures. Degenerative disc disease and facet arthropathy. XR L knee 11/10/2021: Osteopenia, medial joint space narrowing, no fractures, moderate tricompartmental degenerative changes  XR ?R (labeled as Left) knee 11/10/2021: osteopenia, medial joint space compartment narrowing, chondrocalcinosis, up to moderate tricompartmental degenerative changes. (Likely mislabeled as there are duplicate left knee reports).     Component      Latest Ref Rng & Units 11/12/2021           8:45 AM   Platelet Count      960 - 400 K/uL 217     Component      Latest Ref Rng & Units 11/12/2021           8:45 AM   Alk Phos      40 - 130 U/L 93   ALT      0 - 33 U/L 13   AST      0 - 35 U/L 22     Component      Latest Ref Rng & Units 11/12/2021           8:45 AM   Creatinine      0.50 - 0.90 mg/dL 0.75       -After careful consideration, treatment with opioid medications was offered. Pt has severe pain that has not been responsive to non-opioid analgesics. Discussed the risks, side effects, and symptoms that would warrant urgent or emergent physician evaluation. Discussed that we will only prescribe opioids at the lowest effective dose for the shortest duration required to reduce pain while focusing on maintaining function. Appropriate diagnoses for treatment with opioids will be listed in the full assessment note in diagnosis section. Duration of opioid treatment will be for the shortest duration as possible or for one month, whichever is shorter. Discussed the principles of opioid tolerance as well as the concerns regarding opioid diversion, misuse, and abuse. Discussed the risks of respiratory depression and death while on opioid medications, especially when combined with other sedative substances. Discussed the patient's responsibility to safely store and dispose of opioid pain medications, preferably store in a locked and secure location and dispose of at routine law enforcement supervised prescription medication disposal events. Opioid prescriptions will be accompanied by prescription of Naloxone. Discussed the patient's responsibility to obtain evaluation with a specialist or surgeon in the area of the body affected by the pain. Discussed the elevated risks of morbidity and mortality while on opioid analgesics.  -Discussed the clinic's controlled substance agreement/NDP in great detail. All questions were answered. Pt expressed complete understanding and explicit agreement. She will sign this form today.  -Will obtain a urine drug screen today. Pt denies any illicit substances. Pt has not used any controlled substances within the last week.   -OARRS 12/10/2021 was reviewed    Controlled Substances Monitoring: Periodic Controlled Substance Monitoring: Possible medication side effects, risk of tolerance/dependence & alternative treatments discussed., No signs of potential drug abuse or diversion identified. , Assessed functional status., Obtaining appropriate analgesic effect of treatment. , Random urine drug screen sent today. Jose Roy MD)     Family history of alcohol abuse +1 father heavy alcohol use  Family history of illegal drug abuse 0  Family history of prescription drug abuse 0    Personal history of alcohol abuse/DUI 0  Personal history of illegal drug abuse 0  Personal history of prescription drug abuse 0    Age between 17-45 0    History of preadolescent sexual abuse 0    Personal history of obsessive compulsive disorder 0  Personal history of attention deficit disorder 0  Personal history of bipolar disorder 0  Personal history of schizophrenia 0  Personal history of depression 0    Score = 1, low risk    Assessment:      Diagnosis Orders   1. Bilateral primary osteoarthritis of knee  [facility]    HYDROcodone-acetaminophen (NORCO) 5-325 MG per tablet    Urine Drug Screen    lidocaine (LMX) 4 % cream    diclofenac sodium (VOLTAREN) 1 % GEL    AR ARTHROCENTESIS ASPIR&/INJ MAJOR JT/BURSA W/O US    CHG FLUOROSCOPIC GUIDANCE NEEDLE PLACEMENT ADD ON    AR KO SINGLE UPRIGHT PREFAB OTS   2. Chronic, continuous use of opioids  naloxone 4 MG/0.1ML LIQD nasal spray    HYDROcodone-acetaminophen (NORCO) 5-325 MG per tablet   3. Encounter for monitoring opioid maintenance therapy  HYDROcodone-acetaminophen ([address]) 5-325 MG per tablet       Plan:     Periodic Controlled Substance Monitoring: Possible medication side effects, risk of tolerance/dependence & alternative treatments discussed., No signs of potential drug abuse or diversion identified. , Assessed functional status., Obtaining appropriate analgesic effect of treatment. , Random urine drug screen sent today.  Jose Roy MD)    Orders Placed This Encounter   Medications    naloxone 4 MG/0.1ML LIQD nasal spray     Si spray by Nasal route as needed for Opioid Reversal     Dispense:  1 each     Refill:  0    HYDROcodone-acetaminophen (NORCO) 5-325 MG per tablet     Sig: Take 1 tablet by mouth daily as needed for Pain for up to 30 days. Dispense:  30 tablet     Refill:  0     Reduce doses taken as pain becomes manageable    lidocaine (LMX) 4 % cream     Sig: Apply a half dollar sized amount to intact skin topically up to twice daily as needed for pain     Dispense:  45 g     Refill:  1    diclofenac sodium (VOLTAREN) 1 % GEL     Sig: Apply 4 g topically 4 times daily     Dispense:  240 g     Refill:  1       Orders Placed This Encounter   Procedures    Urine Drug [address] [facility] [place]     Referral Priority:   Routine     Referral Type:   Eval and Treat     Referral Reason:   Specialty Services Required     Requested Specialty:   Physical Therapy     Number of Visits Requested:   1    CHG FLUOROSCOPIC GUIDANCE NEEDLE PLACEMENT ADD ON     Standing Status:   Future     Standing Expiration Date:   3/10/2022    VT ARTHROCENTESIS ASPIR&/INJ MAJOR JT/BURSA W/O US     Bilateral knee corticosteroid joint inj under XR with Dr Luzma Menjivar. No anticoagulation, no antibiotics, no diabetes mellitus, +osteopenia no bleeding or platelet dysfunction, IV Dye okay, allergies reviewed, 15 minute procedure.      Standing Status:   Future     Standing Expiration Date:   3/10/2022    VT KO SINGLE UPRIGHT PREFAB OTS     bilateral medial     Standing Status:   Future     Standing Expiration Date:   3/10/2022       -PT for knee osteoarthritis   -Follow-up with Dr Bender [place] for osteopenia  -Reach out to radiology, ?mislabeled XR R knee report  -Consideration for MRI LS Spine without contrast may be given if her symptoms do not improve with conservative treatment  -Lidocaine 4% ointment topical BID prn #1 tube one refill start 12/10/2021   -Voltaren gel 1% gel #240 gm one ref start 12/10/2021   -No oral NSAIDs recommended due to age and risk of comorbidities  -Do not recommend Amitriptyline Elavil due to age and anticholinergic side effects  -Consider Tizanidine   -She notes that she last used opioids over 3 years ago. It was helping her remain functional to perform activities of daily living and live independently.   -Start Norco 5/325 mg daily #30 no ref start 12/10-1/9/2022. OARRS reviewed on 12/10/2021   -Naloxone prescribed on 12/10/21. MME 5  -Bilateral knee corticosteroid joint inj under XR with Dr Marcelo Avila. No anticoagulation, no antibiotics, no diabetes mellitus, +osteopenia no bleeding or platelet dysfunction, IV Dye okay, allergies reviewed, 15 minute procedure. Consider 12 mg total betamethasone    -Recommend a bilateral knee medial offloading Freestyle brace for the patient's bilateral knee osteoarthritis. The patient is ambulatory but has weakness and instability of the respective knee(s) that require(s) stabilization from this semi-rigid orthosis to improve her function and reduce pain    Controlled Substance Monitoring:    Acute and Chronic Pain Monitoring:   RX Monitoring 12/10/2021   Periodic Controlled Substance Monitoring Possible medication side effects, risk of tolerance/dependence & alternative treatments discussed. ;No signs of potential drug abuse or diversion identified. ;Assessed functional status. ;Obtaining appropriate analgesic effect of treatment. ;Random urine drug screen sent today. Discussed the risks, side effects, and symptoms that would warrant urgent or emergent physician evaluation of all medications prescribed today.       Discussed the risks of the above recommended procedures including but not limited to bleeding, infection, worsened pain, damage to surrounding structures, side effects, toxicity, allergic reactions to medications used, immune and stress-response dysfunction, fat necrosis, decreased bone mineralization, cartilage loss, increased fracture risk, avascular necrosis, skin pigmentation changes, blood sugar elevation, need for surgery, premature damage or degeneration of the joint, as well as catastrophic injury such as vision loss, paralysis, stroke, bowel or bladder incontinence, ventilator dependence, loss of use of the joint and/or extremity, and death. Discussed the risks, benefits, alternative procedures, and alternatives to the procedure including no procedure at all. Discussed that we cannot undo any permanent neurologic or orthopaedic damage or change the course of any underlying disease. After thorough discussion, patient expressed complete understanding and willingness to proceed. Provided education and counseling regarding the diagnosis, prognosis, and treatment options. All questions were answered. Encouraged her to follow-up with her primary care physician and/or specialists as required for her overall health and management of her comorbidities as well as any new positive symptoms mentioned in review of systems above. Care was provided within the definitions and limitations of our specialty practice. Encouraged lifestyle interventions including healthy habits, lifestyle changes, regular aerobic exercise and appropriate weight maintenance as advised by their primary care physician or cardiovascular health provider. Discussed well care and disease prevention/maintenance. All recommendations for therapy are provided to improve function with activities of daily living, decrease pain, and help develop an exercise program. All recommendations for medications are meant to help decrease pain, improve function with activities of daily living, maintain compliance with home exercise program, and improve quality of life. All recommendations for therapeutic injections are meant to help decrease pain, improve function with activities of daily living, maintain compliance with home exercise program, improve quality of life, and decrease reliance upon oral medications.      Encouraged compliance with her home exercise program. Recommended compliance with physical therapy program as outlined above. Informed her to wear bracing as appropriate, and that bracing is not a replacement for core strengthening or muscle stabilization. Discussed the elevated risks of excessive sedation while on pain medications. Advised her against driving or operating heavy machinery or performing any activities where she may harm herself or others while on pain medications. Particular caution was emphasized especially during dose adjustments and medication changes. Discussed the elevated risks of respiratory depression and death while on opioid medications, especially when combined with other sedative substances. Discussed side effects of opioids including, but not limited to, itching, constipation, nausea, and vomiting. The patient does not demonstrate any overt signs of alcohol or drug abuse, and there are no potential contraindications to the use of controlled substances. The relevant previous medical records were reviewed. The patient continues to make good-felipe efforts to reduce and eliminate use of opioids through our comprehensive multidisciplinary pain treatment program.    Discussed the risks of temporary disability, permanent disability, morbidity, and mortality with poorly-managed or undiagnosed medical conditions and comorbidities. Emphasized the importance of timely medical evaluation and treatment as previously recommended by us or other medical professionals. Risks of not pursing these recommendations were emphasized. The patient was offered a treatment at our facility. The physician and patient have discussed in detail the risk of exposure to and/or potential harm posed by the COVID-19 virus with having a office visits and procedures at this time versus the risk of delaying the visits and procedures.  It is not possible to know either the risk of delaying the visits or procedure or chance of getting an infection with perfect accuracy, but a joint decision was made between the patient and the physician to proceed at this time with the scheduled visits and procedures. Advised her that any lab testing, imaging, or other diagnostic test results are best discussed in person in the office so that we can provide a clear explanation of their significance and best treatment based upon these results. It is her responsibility to make and keep a follow up appointment to discuss these test results in person to discuss the significance of the findings and appropriate follow-up steps. She expressed complete understanding and agreement with the entire plan as outlined above. Portions of this note may have been typed, auto-populated, dictated or transcribed by voice recognition resulting in errors, omissions, or close substitutions which may be missed despite careful proofreading. Please contact the author for any questions or concerns. Thank you Dr. Marylin Cruz for the opportunity to participate in this patient's care. If you have any questions or concerns, please do not hesitate to contact us. Follow up:  Return in about 1 month (around 1/10/2022) for reassessment of pain and symptoms, P.T. Internal Ref.     Monica William MD

## 2021-12-10 NOTE — PROGRESS NOTES
on file    Emotionally Abused: Not on file    Physically Abused: Not on file    Sexually Abused: Not on file   Housing Stability:     Unable to Pay for Housing in the Last Year: Not on file    Number of Places Lived in the Last Year: Not on file    Unstable Housing in the Last Year: Not on file     Allergies   Allergen Reactions    Azithromycin Nausea And Vomiting    Ceftin  [Cefuroxime Axetil] Nausea And Vomiting     Current Outpatient Medications on File Prior to Visit   Medication Sig Dispense Refill    losartan (COZAAR) 50 MG tablet Take 50 mg by mouth daily      albuterol sulfate  (90 Base) MCG/ACT inhaler Inhale 1 puff into the lungs every 6 hours as needed for Wheezing or Shortness of Breath 18 g 3    rOPINIRole (REQUIP) 0.5 MG tablet ropinirole 0.5 mg tablet 90 tablet 1    levothyroxine (SYNTHROID) 88 MCG tablet Synthroid 88 mcg tablet 90 tablet 1    tiotropium (SPIRIVA RESPIMAT) 2.5 MCG/ACT AERS inhaler Inhale 2 puffs into the lungs daily 1 Inhaler 2    EQ ALLERGY RELIEF 180 MG tablet TAKE 1 TABLET BY MOUTH ONCE DAILY 90 tablet 1    amitriptyline (ELAVIL) 10 MG tablet amitriptyline 10 mg tablet   TAKE TWO TABLETS BY MOUTH ONCE DAILY AS NEEDED      Sucralfate (CARAFATE PO) Carafate      furosemide (LASIX) 20 MG tablet Lasix 20 mg tablet   TAKE 1 TABLET BY MOUTH ONCE DAILY AS NEEDED      esomeprazole (NEXIUM) 40 MG delayed release capsule Take 1 capsule po 30 minutes before your largest meal      gabapentin (NEURONTIN) 100 MG capsule Take 1 capsule by mouth every 12 hours as needed (leg or back pain) for up to 7 days. Intended supply: 7 days 14 capsule 0     No current facility-administered medications on file prior to visit. I have personally reviewed the ROS, PMH, PFH, and social history     Review of Systems   Constitutional: Negative for chills and fever. HENT: Negative for congestion. Eyes: Negative for pain. Respiratory: Negative for shortness of breath. Cardiovascular: Negative for chest pain. Gastrointestinal: Negative for abdominal pain. Genitourinary: Negative for hematuria. Musculoskeletal: Negative for back pain. Allergic/Immunologic: Negative for immunocompromised state. Neurological: Negative for headaches. Psychiatric/Behavioral: Negative for hallucinations. Objective:   /83 (Site: Left Upper Arm, Position: Sitting, Cuff Size: Large Adult)   Pulse 61   Temp 97.8 °F (36.6 °C) (Temporal)   Resp 15   Ht 5' 5\" (1.651 m)   Wt 196 lb (88.9 kg)   SpO2 97%   BMI 32.62 kg/m²     Physical Exam  Constitutional:       General: She is not in acute distress. Appearance: Normal appearance. She is not ill-appearing, toxic-appearing or diaphoretic. HENT:      Head: Normocephalic. Neck:      Vascular: No carotid bruit. Cardiovascular:      Rate and Rhythm: Normal rate and regular rhythm. Pulses: Normal pulses. Heart sounds: Normal heart sounds. No murmur heard. No friction rub. No gallop. Pulmonary:      Effort: Pulmonary effort is normal. No respiratory distress. Breath sounds: Normal breath sounds. No wheezing, rhonchi or rales. Abdominal:      General: Abdomen is flat. There is no distension. Palpations: Abdomen is soft. Tenderness: There is no abdominal tenderness. There is no right CVA tenderness, left CVA tenderness, guarding or rebound. Musculoskeletal:      Cervical back: Neck supple. Right lower leg: No edema. Left lower leg: No edema. Skin:     General: Skin is warm. Findings: No erythema or rash. Neurological:      Mental Status: She is alert. Psychiatric:         Mood and Affect: Mood normal.           Assessment:       Diagnosis Orders   1. Neuropathy     2. Restless leg syndrome     3. Weakness of both lower extremities     4. Centrilobular emphysema ([address]     5.  Moderate major depression ([address]           Plan:     vc  Has appt with pain today   Will probably not complete MRI  Echo not done  Referred to [facility] 11/2021  See orders  FU Vitamin levels 3 to 4 months   Refusing statin   She will get covid booster    (Bilateral scarring/fibrosis as described. Moderate to large hiatal hernia.) (Doesn't want referrals)   SEE ORDERS. No orders of the defined types were placed in this encounter. No orders of the defined types were placed in this encounter. dtr will call me today if pain mgmt doesn't refill or initiate pain medications. Risk of permanent nerve damage explained    Physical Exam:  Height and weight: WEIGHTWeight: 196 lb (88.9 kg), HEIGHTHeight: 5' 5\" (165.1 cm)  Cardiopulmonary exam: See Progress Note  Musculoskeletal exam: See Progress Note  Neurological Exam: (Gait, Balance, Coordination): See Progress Note    40 Minutes spent total     Symptoms that limit ambulation are chronic back pain, suspected spinal stenosis    Diagnosis that are responsible for these symptoms:  See visit diagnosis list.    Medication or other treatment for these symptoms: See visit medication list .    Progression of ambulation difficulty over time has worsened    Other diagnoses that may relate to ambulatory problems include  Chronic knee and hip pain     The patient can not walk 0 feet without stopping. Pace of ambulation is slow and unsteady even while using  \"anything\"    History of falls.  0 (because she is in wheel chair) Frequency: never   Circumstances leading to falls: n/a    Nguyen Estrada is isn't sufficient due to: she is too weak , unsafe     Ambulatory assistance currently used is  Wheelchair and it is not sufficient due to inability to meet her activities of adl/iadl    Patient's ability to independently perform personal cares  (MRADLs) has worsened and now requires use of power mobility device.     Patient is unable to use a manual wheelchair due to she is not strong enough to use her regular wheel chair to enter home and go into her bathroom     Patient is not able to stand up from a seated position without assistance. A power operated scooter would not be sufficient for this patient's need in the home due to patient can not safely transfer in and out of scooter, home environment does not provide adequate access for maneuvering scooter, lacks postural stability, U/E strength 5/5, home will not accomodate scooter, she has to rest frequently due to weakness when using manual wheel chair     The patient has a mobility limitation that significantly impairs his/her ability to participate in one or more mobility-related activities of daily living (MRADLs)  in the home personal cares    The mobility deficit cannot be sufficiently and safely resolved by the use of an appropriately fitted cane or walker. Home setting is conducive to accommodate use of power wheelchair in the home and use of a power wheelchair will significantly improve the patient's ability to participate in toileting, personal cares and ambulating in the home           The patient is able to safely transfer to and from a power wheelchair, operate the tiller steering system and maintain postural stability and position while operating the power wheelchair in the home. The patient's mental capabilities and physical capabilities are sufficient for safe mobility using a power wheelchair in the home. The patient's weight is less than or equal to the weight capacity of the power wheelchair that is provided. The patient has not expressed an unwillingness to use a power wheelchair in the home. Patient has a medical condition(s) including spinal stenosis  which requires positioning of the body in ways not feasible with an ordinary bed. (COPD and has to sit up due to copd, can't lay flat)     Patient requires positioning of the body in ways not feasible with an ordinary bed in order to alleviate pain.     Patient requires the head of the bed to be elevated more than 30 degrees most of the time due to Copd     Patient has documented severe arthritis of the hip or knee, or has a severe neuromuscular disease: Yes. The seat lift mechanism will improve this patient's mobility, improve ability to perform ADLs and slow down the progression of patient's condition: Yes. Patient is unable to stand up from a regular armchair or any chair in the home: No    Once standing, patient is able to walk using a na, not able to walk without assistance. MRI (not completed )  Has not been taking thyroid  Repeat levels in 3 months. No urinary incontinence, no urinary retention, no fevers, no chills, no numbness in or around groin, no weakness. If anything should change or worsen call ASAP, don't wait for next scheduled appointment. No follow-ups on file.       Macario Bhat MD

## 2021-12-11 ENCOUNTER — TELEPHONE (OUTPATIENT)
Dept: FAMILY MEDICINE CLINIC | Age: 86
End: 2021-12-11

## 2021-12-11 LAB — VITAMIN B1 WHOLE BLOOD: 80 NMOL/L (ref 70–180)

## 2021-12-15 LAB
GASTRIC PARIETAL CELL ANTIBODY: 1.7 UNITS (ref 0–24.9)
INTRINSIC FACTOR BLOCKING AB: NEGATIVE

## 2021-12-15 NOTE — TELEPHONE ENCOUNTER
I will need a script for the wheel chair, and hospital bed. Lift chair's are not covered. Thank you!

## 2022-02-08 ENCOUNTER — TELEPHONE (OUTPATIENT)
Dept: PAIN MANAGEMENT | Age: 87
End: 2022-02-08

## 2022-02-08 NOTE — TELEPHONE ENCOUNTER
ORDER PLACED:    Date: 12/10/21  Description: RIGHT KNEE BRACE  Order Number: [id number]  Ordering Provider: ROXY  Performing Provider:   CPT Codes:   ICD10 Codes: M17.0    PER  DME SITE THIS BRACE OR ANYTHING SIMILAR HAS BEEN BILLED IN THE LAST 5 YEARS

## 2022-03-11 ENCOUNTER — OFFICE VISIT (OUTPATIENT)
Dept: PAIN MANAGEMENT | Age: 87
End: 2022-03-11
Payer: MEDICARE

## 2022-03-11 ENCOUNTER — OFFICE VISIT (OUTPATIENT)
Dept: FAMILY MEDICINE CLINIC | Age: 87
End: 2022-03-11
Payer: MEDICARE

## 2022-03-11 ENCOUNTER — TELEPHONE (OUTPATIENT)
Dept: FAMILY MEDICINE CLINIC | Age: 87
End: 2022-03-11

## 2022-03-11 VITALS
BODY MASS INDEX: 34.66 KG/M2 | DIASTOLIC BLOOD PRESSURE: 70 MMHG | WEIGHT: 208 LBS | OXYGEN SATURATION: 99 % | HEART RATE: 74 BPM | HEIGHT: 65 IN | SYSTOLIC BLOOD PRESSURE: 136 MMHG | TEMPERATURE: 97.7 F

## 2022-03-11 VITALS — TEMPERATURE: 98.5 F | HEIGHT: 65 IN | WEIGHT: 208 LBS | BODY MASS INDEX: 34.66 KG/M2

## 2022-03-11 DIAGNOSIS — Z51.81 ENCOUNTER FOR MONITORING OPIOID MAINTENANCE THERAPY: ICD-10-CM

## 2022-03-11 DIAGNOSIS — Z12.11 SCREENING FOR COLON CANCER: ICD-10-CM

## 2022-03-11 DIAGNOSIS — R09.89 ABNORMAL PULSE: ICD-10-CM

## 2022-03-11 DIAGNOSIS — E53.8 VITAMIN B12 DEFICIENCY: ICD-10-CM

## 2022-03-11 DIAGNOSIS — E03.9 HYPOTHYROIDISM, UNSPECIFIED TYPE: ICD-10-CM

## 2022-03-11 DIAGNOSIS — Z79.891 ENCOUNTER FOR MONITORING OPIOID MAINTENANCE THERAPY: ICD-10-CM

## 2022-03-11 DIAGNOSIS — F11.90 CHRONIC, CONTINUOUS USE OF OPIOIDS: ICD-10-CM

## 2022-03-11 DIAGNOSIS — M17.0 BILATERAL PRIMARY OSTEOARTHRITIS OF KNEE: ICD-10-CM

## 2022-03-11 DIAGNOSIS — F32.1 MODERATE MAJOR DEPRESSION (HCC): Primary | ICD-10-CM

## 2022-03-11 DIAGNOSIS — R06.09 DOE (DYSPNEA ON EXERTION): ICD-10-CM

## 2022-03-11 DIAGNOSIS — J43.2 CENTRILOBULAR EMPHYSEMA (HCC): ICD-10-CM

## 2022-03-11 PROCEDURE — 99214 OFFICE O/P EST MOD 30 MIN: CPT | Performed by: INTERNAL MEDICINE

## 2022-03-11 PROCEDURE — G8427 DOCREV CUR MEDS BY ELIG CLIN: HCPCS | Performed by: INTERNAL MEDICINE

## 2022-03-11 PROCEDURE — 4040F PNEUMOC VAC/ADMIN/RCVD: CPT | Performed by: INTERNAL MEDICINE

## 2022-03-11 PROCEDURE — G8400 PT W/DXA NO RESULTS DOC: HCPCS | Performed by: INTERNAL MEDICINE

## 2022-03-11 PROCEDURE — 1090F PRES/ABSN URINE INCON ASSESS: CPT | Performed by: PHYSICAL MEDICINE & REHABILITATION

## 2022-03-11 PROCEDURE — 4040F PNEUMOC VAC/ADMIN/RCVD: CPT | Performed by: PHYSICAL MEDICINE & REHABILITATION

## 2022-03-11 PROCEDURE — 1036F TOBACCO NON-USER: CPT | Performed by: INTERNAL MEDICINE

## 2022-03-11 PROCEDURE — G8427 DOCREV CUR MEDS BY ELIG CLIN: HCPCS | Performed by: PHYSICAL MEDICINE & REHABILITATION

## 2022-03-11 PROCEDURE — G8484 FLU IMMUNIZE NO ADMIN: HCPCS | Performed by: PHYSICAL MEDICINE & REHABILITATION

## 2022-03-11 PROCEDURE — G8417 CALC BMI ABV UP PARAM F/U: HCPCS | Performed by: PHYSICAL MEDICINE & REHABILITATION

## 2022-03-11 PROCEDURE — G8417 CALC BMI ABV UP PARAM F/U: HCPCS | Performed by: INTERNAL MEDICINE

## 2022-03-11 PROCEDURE — G8400 PT W/DXA NO RESULTS DOC: HCPCS | Performed by: PHYSICAL MEDICINE & REHABILITATION

## 2022-03-11 PROCEDURE — 3023F SPIROM DOC REV: CPT | Performed by: INTERNAL MEDICINE

## 2022-03-11 PROCEDURE — 1036F TOBACCO NON-USER: CPT | Performed by: PHYSICAL MEDICINE & REHABILITATION

## 2022-03-11 PROCEDURE — 1123F ACP DISCUSS/DSCN MKR DOCD: CPT | Performed by: PHYSICAL MEDICINE & REHABILITATION

## 2022-03-11 PROCEDURE — G8484 FLU IMMUNIZE NO ADMIN: HCPCS | Performed by: INTERNAL MEDICINE

## 2022-03-11 PROCEDURE — 1123F ACP DISCUSS/DSCN MKR DOCD: CPT | Performed by: INTERNAL MEDICINE

## 2022-03-11 PROCEDURE — 99213 OFFICE O/P EST LOW 20 MIN: CPT | Performed by: PHYSICAL MEDICINE & REHABILITATION

## 2022-03-11 PROCEDURE — 1090F PRES/ABSN URINE INCON ASSESS: CPT | Performed by: INTERNAL MEDICINE

## 2022-03-11 RX ORDER — HYDROCODONE BITARTRATE AND ACETAMINOPHEN 5; 325 MG/1; MG/1
1 TABLET ORAL DAILY PRN
Qty: 30 TABLET | Refills: 0 | Status: SHIPPED | OUTPATIENT
Start: 2022-03-11 | End: 2022-04-14 | Stop reason: SDUPTHER

## 2022-03-11 ASSESSMENT — PATIENT HEALTH QUESTIONNAIRE - PHQ9
4. FEELING TIRED OR HAVING LITTLE ENERGY: 0
SUM OF ALL RESPONSES TO PHQ9 QUESTIONS 1 & 2: 0
5. POOR APPETITE OR OVEREATING: 0
8. MOVING OR SPEAKING SO SLOWLY THAT OTHER PEOPLE COULD HAVE NOTICED. OR THE OPPOSITE, BEING SO FIGETY OR RESTLESS THAT YOU HAVE BEEN MOVING AROUND A LOT MORE THAN USUAL: 0
10. IF YOU CHECKED OFF ANY PROBLEMS, HOW DIFFICULT HAVE THESE PROBLEMS MADE IT FOR YOU TO DO YOUR WORK, TAKE CARE OF THINGS AT HOME, OR GET ALONG WITH OTHER PEOPLE: 0
9. THOUGHTS THAT YOU WOULD BE BETTER OFF DEAD, OR OF HURTING YOURSELF: 0
2. FEELING DOWN, DEPRESSED OR HOPELESS: 0
SUM OF ALL RESPONSES TO PHQ QUESTIONS 1-9: 0
SUM OF ALL RESPONSES TO PHQ QUESTIONS 1-9: 0
1. LITTLE INTEREST OR PLEASURE IN DOING THINGS: 0
SUM OF ALL RESPONSES TO PHQ QUESTIONS 1-9: 0
7. TROUBLE CONCENTRATING ON THINGS, SUCH AS READING THE NEWSPAPER OR WATCHING TELEVISION: 0
3. TROUBLE FALLING OR STAYING ASLEEP: 0
6. FEELING BAD ABOUT YOURSELF - OR THAT YOU ARE A FAILURE OR HAVE LET YOURSELF OR YOUR FAMILY DOWN: 0
SUM OF ALL RESPONSES TO PHQ QUESTIONS 1-9: 0

## 2022-03-11 ASSESSMENT — ENCOUNTER SYMPTOMS
DIARRHEA: 0
SHORTNESS OF BREATH: 0
BACK PAIN: 1
CONSTIPATION: 0
NAUSEA: 0

## 2022-03-11 NOTE — PROGRESS NOTES
Subjective:      Patient ID: Cristy Carcamo is a 82 y.o. 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 Social History     Socioeconomic History    Marital status:      Spouse name: Not on file    Number of children: Not on file    Years of education: Not on file    Highest education level: Not on file   Occupational History    Not on file   Tobacco Use    Smoking status: Never Smoker    Smokeless tobacco: Never Used   Substance and Sexual Activity    Alcohol use: No    Drug use: No    Sexual activity: Not Currently   Other Topics Concern    Not on file   Social History Narrative    Not on file     Social Determinants of Health     Financial Resource Strain: Low Risk     Difficulty of Paying Living Expenses: Not hard at all   Food Insecurity: No Food Insecurity    Worried About [facility] in the Last Year: Never true    [facility] in the Last Year: Never true   Transportation Needs:     Lack of Transportation (Medical): Not on file    Lack of Transportation (Non-Medical):  Not on file   Physical Activity:     Days of Exercise per Week: Not on file    Minutes of Exercise per Session: Not on file   Stress:     Feeling of Stress : Not on file   Social Connections:     Frequency of Communication with Friends and Family: Not on file    Frequency of Social Gatherings with Friends and Family: Not on file    Attends Jainism Services: Not on file    Active Member of Clubs or Organizations: Not on file    Attends Club or Organization Meetings: Not on file    Marital Status: Not on file   Intimate Partner Violence:     Fear of Current or Ex-Partner: Not on file    Emotionally Abused: Not on file    Physically Abused: Not on file    Sexually Abused: Not on file   Housing Stability:     Unable to Pay for Housing in the Last Year: Not on file    Number of [place] in the Last Year: Not on file    Unstable Housing in the Last Year: Not on file     Allergies   Allergen Reactions    Azithromycin Nausea And Vomiting    Ceftin  [Cefuroxime Axetil] Nausea And Vomiting     Current Outpatient Medications on File Prior to Visit   Medication Sig Dispense Refill    rOPINIRole (REQUIP) 0.5 MG tablet Take one tablet po qhs 90 tablet 3    amitriptyline (ELAVIL) 10 MG tablet TAKE TWO TABLETS BY MOUTH ONCE DAILY AT NIGHT 180 tablet 3    Cholecalciferol (VITAMIN D) 50 MCG (2000 UT) CAPS capsule Take by mouth daily      folic acid (FOLVITE) 1 MG tablet Take 1 tablet by mouth daily 90 tablet 3    cyanocobalamin ([employer] VITAMIN B12) 1000 MCG tablet Take 1 tablet by mouth daily 90 tablet 3    SYNTHROID 88 MCG tablet Take one tablet po once daily 90 tablet 3    naloxone 4 MG/0.1ML LIQD nasal spray 1 spray by Nasal route as needed for Opioid Reversal 1 each 0    lidocaine (LMX) 4 % cream Apply a half dollar sized amount to intact skin topically up to twice daily as needed for pain 45 g 1    losartan (COZAAR) 50 MG tablet Take 50 mg by mouth daily      albuterol sulfate  (90 Base) MCG/ACT inhaler Inhale 1 puff into the lungs every 6 hours as needed for Wheezing or Shortness of Breath 18 g 3    Sucralfate (CARAFATE PO) Carafate      furosemide (LASIX) 20 MG tablet Lasix 20 mg tablet   TAKE 1 TABLET BY MOUTH ONCE DAILY AS NEEDED      esomeprazole (NEXIUM) 40 MG delayed release capsule Take 1 capsule po 30 minutes before your largest meal       No current facility-administered medications on file prior to visit. I have personally reviewed the ROS, PMH, PFH, and social history     Review of Systems   Constitutional: Negative for chills and fever. HENT: Negative for congestion. Eyes: Negative for pain. Respiratory: Negative for shortness of breath. Cardiovascular: Negative for chest pain. Gastrointestinal: Negative for abdominal pain. Genitourinary: Negative for hematuria. Musculoskeletal: Negative for back pain. Allergic/Immunologic: Negative for immunocompromised state. Neurological: Negative for headaches. Psychiatric/Behavioral: Negative for hallucinations. Objective:   /70   Pulse 74   Temp 97.7 °F (36.5 °C) (Infrared)   Ht 5' 5\" (1.651 m)   Wt 208 lb (94.3 kg)   SpO2 99%   BMI 34.61 kg/m²     Physical Exam  Constitutional:       General: She is not in acute distress. Appearance: Normal appearance. She is not ill-appearing, toxic-appearing or diaphoretic. HENT:      Head: Normocephalic. Neck:      Vascular: No carotid bruit. Cardiovascular:      Rate and Rhythm: Normal rate and regular rhythm. Pulses: Normal pulses. Heart sounds: Normal heart sounds. No murmur heard. No friction rub. No gallop. Pulmonary:      Effort: Pulmonary effort is normal. No respiratory distress. Breath sounds: Normal breath sounds. No wheezing, rhonchi or rales. Abdominal:      General: Abdomen is flat. There is no distension. Palpations: Abdomen is soft. Tenderness: There is no abdominal tenderness. There is no right CVA tenderness, left CVA tenderness, guarding or rebound. Musculoskeletal:      Cervical back: Neck supple. Right lower leg: No edema. Left lower leg: No edema. Skin:     General: Skin is warm. Findings: No erythema or rash. Neurological:      Mental Status: She is alert. Psychiatric:         Mood and Affect: Mood normal.           Assessment:       Diagnosis Orders   1. Moderate major depression (HCC)  Basic Metabolic Panel   2. Centrilobular emphysema (HCC)  Alpha-1-Antitrypsin    tiotropium (SPIRIVA RESPIMAT) 2.5 MCG/ACT AERS inhaler    Basic Metabolic Panel   3. Screening for colon cancer  Fecal DNA Colorectal cancer screening (Cologuard)    Basic Metabolic Panel   4. ROBIN (dyspnea on exertion)  tiotropium (SPIRIVA RESPIMAT) 2.5 MCG/ACT AERS inhaler    Basic Metabolic Panel   5. Hypothyroidism, unspecified type  TSH with Reflex    T4, Free    Basic Metabolic Panel   6. Abnormal pulse  US SIRENA DAYAN LIMITED 1-2 LEVELS   7. Vitamin B12 deficiency  Vitamin B12 & Folate         Plan:     vc  Refusing statin (From before)   covid Booster doesn't want it  Will probably not complete MRI  (refusing mri/surgery)   Has appt with pain today (this afternoon 3/11/2022)   Echo not done  Wants to hold off on pulmonary testing, pft/ct chest, etc.   (Bilateral scarring/fibrosis as described. Moderate to large hiatal hernia.) (Doesn't want referrals)            Orders Placed This Encounter   Procedures    Fecal DNA Colorectal cancer screening (Cologuard)    US SIRENA DAYAN LIMITED 1-2 LEVELS     Standing Status:   Future     Standing Expiration Date:   3/11/2023    Alpha-1-Antitrypsin     Standing Status:   Future     Standing Expiration Date:   3/11/2023    TSH with Reflex     Standing Status:   Future     Standing Expiration Date:   3/11/2023    T4, Free     Standing Status:   Future     Standing Expiration Date:   3/11/2023    Basic Metabolic Panel     Standing Status:   Future     Standing Expiration Date:   3/11/2023    Vitamin B12 & Folate     Standing Status:   Future     Standing Expiration Date:   3/12/2023     Orders Placed This Encounter   Medications    tiotropium (SPIRIVA RESPIMAT) 2.5 MCG/ACT AERS inhaler     Sig: Inhale 2 puffs into the lungs daily     Dispense:  3 each     Refill:  3   NO SI/HI   Refusing colonoscopy   Will do cologuard  Will do mammogram   Referred to [facility] 11/2021  REFUSED DEXA   Refusing depression treatment. RISK OF amputation   If anything should change or worsen call ASAP, don't wait for next scheduled appointment. Return in about 3 months (around 6/11/2022) for Chronic condition management/appointment, worsening symptoms, call ASAP for appointment.       Gustavo Joe MD

## 2022-03-12 ASSESSMENT — ENCOUNTER SYMPTOMS
BACK PAIN: 0
SHORTNESS OF BREATH: 0
EYE PAIN: 0
ABDOMINAL PAIN: 0

## 2022-03-14 NOTE — TELEPHONE ENCOUNTER
Spoke with Molina Daly states it was supposed to be a \"power wheelchair'. Rx ready to sign, please dot phrase last office note. Thanks.

## 2022-03-28 ENCOUNTER — CLINICAL DOCUMENTATION (OUTPATIENT)
Dept: SPORTS MEDICINE | Age: 87
End: 2022-03-28

## 2022-03-28 ENCOUNTER — TELEPHONE (OUTPATIENT)
Dept: PAIN MANAGEMENT | Age: 87
End: 2022-03-28

## 2022-03-28 DIAGNOSIS — M17.0 BILATERAL PRIMARY OSTEOARTHRITIS OF KNEE: Primary | ICD-10-CM

## 2022-03-28 NOTE — PROGRESS NOTES
[facility]) Physicians  Neurosurgery and Pain [facility]  [address] [facility] [place] [address]: [phone]  F: [phone]             DME Coordinator: Linus Favre        Patient Name: Ivanna Valdes : 1935        Date: 3/28/2022       DME Fitting: Bilateral Shortrunner Knee Brace -     The above brace has been ordered by Dr. Marquis Gordon for primary osteoarthritis, bilateral, knee. she will benefit from this brace for the bilateral knee, to help reduce pain and improve function. she is ambulatory but has weakness and instability of the bilateral knee which requires stabilization from this semi-rigid/rigid orthosis to improve function. I have shown the patient the brace and demonstrated how to properly wear the brace. I advised the patient to wear the brace when beginning any major activity to provide stability and support but to avoid becoming dependent on the brace, unless otherwise directed by your physician. I advised the patient that they don't have to wear the brace during times of inactivity. I advised the patient to adjust the brace for comfort and loosen or tighten as needed. I had the patient try on the brace and we assessed the fit. The patient was fit with the brace in size xxlarge. Per my evaluation and discussion with the patient, this brace will work well for the patient. The patient expressed satisfaction with the fit of this brace and understanding of it's use.

## 2022-03-28 NOTE — TELEPHONE ENCOUNTER
Patient had initial order of Freestyle OA but due to her increased leg angle, they weren't able to fit properly. Put in a new order for Bilateral Short Runner. Order is attached.  Needs signed

## 2022-03-28 NOTE — TELEPHONE ENCOUNTER
[facility] called in they stated they faxed over a request for wheat they were waiting on. They need RX and chart notes updated and faxed back.

## 2022-03-29 ENCOUNTER — TELEPHONE (OUTPATIENT)
Dept: FAMILY MEDICINE CLINIC | Age: 87
End: 2022-03-29

## 2022-03-29 DIAGNOSIS — E53.8 VITAMIN B12 DEFICIENCY: ICD-10-CM

## 2022-03-29 DIAGNOSIS — F32.1 MODERATE MAJOR DEPRESSION (HCC): ICD-10-CM

## 2022-03-29 DIAGNOSIS — Z12.11 SCREENING FOR COLON CANCER: ICD-10-CM

## 2022-03-29 DIAGNOSIS — R06.09 DOE (DYSPNEA ON EXERTION): ICD-10-CM

## 2022-03-29 DIAGNOSIS — J43.2 CENTRILOBULAR EMPHYSEMA (HCC): ICD-10-CM

## 2022-03-29 DIAGNOSIS — E03.9 HYPOTHYROIDISM, UNSPECIFIED TYPE: ICD-10-CM

## 2022-03-29 LAB
ANION GAP SERPL CALCULATED.3IONS-SCNC: 10 MEQ/L (ref 9–15)
BUN BLDV-MCNC: 19 MG/DL (ref 8–23)
CALCIUM SERPL-MCNC: 9.6 MG/DL (ref 8.5–9.9)
CHLORIDE BLD-SCNC: 102 MEQ/L (ref 95–107)
CO2: 26 MEQ/L (ref 20–31)
CREAT SERPL-MCNC: 0.77 MG/DL (ref 0.5–0.9)
GFR AFRICAN AMERICAN: >60
GFR NON-AFRICAN AMERICAN: >60
GLUCOSE BLD-MCNC: 81 MG/DL (ref 70–99)
NONINV COLON CA DNA+OCC BLD SCRN STL QL: NORMAL
POTASSIUM SERPL-SCNC: 4.6 MEQ/L (ref 3.4–4.9)
SODIUM BLD-SCNC: 138 MEQ/L (ref 135–144)
T4 FREE: 1.24 NG/DL (ref 0.84–1.68)
TSH REFLEX: 5.24 UIU/ML (ref 0.44–3.86)

## 2022-03-29 RX ORDER — LOSARTAN POTASSIUM 50 MG/1
50 TABLET ORAL DAILY
Qty: 30 TABLET | Status: CANCELLED | OUTPATIENT
Start: 2022-03-29

## 2022-03-29 NOTE — TELEPHONE ENCOUNTER
Patient requesting medication refill. She states Losartan &   Klor-Con MEQ TAB WISDOM   Were prescribed by A Dr in [place] and she would like to know if she should stay on this medication. Patient also states that you prescribed Diclofenac Sodium (Voltaren) and Pain Management () prescribed Lidocaine (LMX). She would like to know if she should take both of these medications for pain? Please advise, Thank you.

## 2022-03-29 NOTE — TELEPHONE ENCOUNTER
Voltaren and lidocaine were rxed through pain  Also verify is she on losartan 50 mg once daily now and potassium or just the losartan>?

## 2022-03-30 LAB
FOLATE: 18.5 NG/ML
VITAMIN B-12: 329 PG/ML (ref 232–1245)

## 2022-03-31 LAB — ALPHA-1 ANTITRYPSIN: 136 MG/DL (ref 90–200)

## 2022-04-11 ENCOUNTER — TELEPHONE (OUTPATIENT)
Dept: FAMILY MEDICINE CLINIC | Age: 87
End: 2022-04-11

## 2022-04-11 NOTE — TELEPHONE ENCOUNTER
----- Message from [place], [place] sent at 4/8/2022  1:59 PM EDT -----  Subject: Message to Provider    QUESTIONS  Information for Provider? Diedre Goldmann calling from [facility] needing the   information requested on 3/28. She faxed over with covoer letter stating   what was needed and has yet t receive the fill request. fax to [phone]  ---------------------------------------------------------------------------  --------------  CALL BACK INFO  What is the best way for the office to contact you? Do not leave any   message, patient will call back for answer  Preferred Call Back Phone Number? [phone]  ---------------------------------------------------------------------------  --------------  SCRIPT ANSWERS  Relationship to Patient? Third Party  Third Party Type? Durable Medical Equipment? Representative Name?  Diedre Goldmann

## 2022-04-14 ENCOUNTER — OFFICE VISIT (OUTPATIENT)
Dept: PAIN MANAGEMENT | Age: 87
End: 2022-04-14
Payer: MEDICARE

## 2022-04-14 VITALS — HEIGHT: 65 IN | BODY MASS INDEX: 34.66 KG/M2 | TEMPERATURE: 97.7 F | WEIGHT: 208 LBS

## 2022-04-14 DIAGNOSIS — Z79.891 ENCOUNTER FOR MONITORING OPIOID MAINTENANCE THERAPY: ICD-10-CM

## 2022-04-14 DIAGNOSIS — Z51.81 ENCOUNTER FOR MONITORING OPIOID MAINTENANCE THERAPY: ICD-10-CM

## 2022-04-14 DIAGNOSIS — M17.0 BILATERAL PRIMARY OSTEOARTHRITIS OF KNEE: Primary | ICD-10-CM

## 2022-04-14 DIAGNOSIS — F11.90 CHRONIC, CONTINUOUS USE OF OPIOIDS: ICD-10-CM

## 2022-04-14 PROCEDURE — 1123F ACP DISCUSS/DSCN MKR DOCD: CPT | Performed by: NURSE PRACTITIONER

## 2022-04-14 PROCEDURE — 1090F PRES/ABSN URINE INCON ASSESS: CPT | Performed by: NURSE PRACTITIONER

## 2022-04-14 PROCEDURE — G8400 PT W/DXA NO RESULTS DOC: HCPCS | Performed by: NURSE PRACTITIONER

## 2022-04-14 PROCEDURE — G8427 DOCREV CUR MEDS BY ELIG CLIN: HCPCS | Performed by: NURSE PRACTITIONER

## 2022-04-14 PROCEDURE — 4040F PNEUMOC VAC/ADMIN/RCVD: CPT | Performed by: NURSE PRACTITIONER

## 2022-04-14 PROCEDURE — 1036F TOBACCO NON-USER: CPT | Performed by: NURSE PRACTITIONER

## 2022-04-14 PROCEDURE — G8417 CALC BMI ABV UP PARAM F/U: HCPCS | Performed by: NURSE PRACTITIONER

## 2022-04-14 PROCEDURE — 99214 OFFICE O/P EST MOD 30 MIN: CPT | Performed by: NURSE PRACTITIONER

## 2022-04-14 RX ORDER — HYDROCODONE BITARTRATE AND ACETAMINOPHEN 5; 325 MG/1; MG/1
1 TABLET ORAL EVERY 6 HOURS PRN
COMMUNITY
End: 2022-04-14

## 2022-04-14 RX ORDER — HYDROCODONE BITARTRATE AND ACETAMINOPHEN 5; 325 MG/1; MG/1
1 TABLET ORAL DAILY PRN
Qty: 30 TABLET | Refills: 0 | Status: SHIPPED | OUTPATIENT
Start: 2022-04-14 | End: 2022-05-17 | Stop reason: SDUPTHER

## 2022-04-14 ASSESSMENT — ENCOUNTER SYMPTOMS
SHORTNESS OF BREATH: 0
ABDOMINAL PAIN: 0
SORE THROAT: 0

## 2022-04-14 NOTE — PROGRESS NOTES
Jennifer Osborn  (1935)    4/14/2022    Subjective:     Jennifer Osborn is 82 y.o. female who complains today of:    Chief Complaint   Patient presents with    Back Pain    Knee Pain         Allergies:  Azithromycin and Ceftin  [cefuroxime axetil]    Past Medical History:   Diagnosis Date    Arthritis     Arthritis of both knees     Chronic back pain     COPD (chronic obstructive pulmonary disease) (HCC)     Depression     Deviated septum     per patient     Emphysema (subcutaneous) (surgical) resulting from a procedure     Fibromyalgia     Irritable bowel syndrome     Lyme arthritis ([facility] [address]     Neuropathy     Pregnancy, GI problems     Pulmonary fibrosis ([facility]     per patient     Restless leg syndrome     Spinal stenosis     Urinary incontinence      History reviewed. No pertinent surgical history.   Family History   Problem Relation Age of Onset    High Cholesterol Mother     Arthritis Mother     High Cholesterol Father     Heart Failure Father     Arthritis Father     Heart Disease Father     High Cholesterol Sister     Heart Failure Sister     Heart Failure Maternal Grandmother     High Cholesterol Maternal Grandmother     Heart Failure Paternal Grandfather     High Cholesterol Paternal Grandfather      Social History     Socioeconomic History    Marital status:      Spouse name: Not on file    Number of children: Not on file    Years of education: Not on file    Highest education level: Not on file   Occupational History    Not on file   Tobacco Use    Smoking status: Never Smoker    Smokeless tobacco: Never Used   Substance and Sexual Activity    Alcohol use: No    Drug use: No    Sexual activity: Not Currently   Other Topics Concern    Not on file   Social History Narrative    Not on file     Social Determinants of Health     Financial Resource Strain: Low Risk     Difficulty of Paying Living Expenses: Not hard at all   Food Insecurity: No Food Insecurity    Worried About Running Out of Food in the Last Year: Never true    [place] of Food in the Last Year: Never true   Transportation Needs:     Lack of Transportation (Medical): Not on file    Lack of Transportation (Non-Medical):  Not on file   Physical Activity:     Days of Exercise per Week: Not on file    Minutes of Exercise per Session: Not on file   Stress:     Feeling of Stress : Not on file   Social Connections:     Frequency of Communication with Friends and Family: Not on file    Frequency of Social Gatherings with Friends and Family: Not on file    Attends [employer] Services: Not on file    Active Member of Clubs or Organizations: Not on file    Attends Club or Organization Meetings: Not on file    Marital Status: Not on file   Intimate Partner Violence:     Fear of Current or Ex-Partner: Not on file    Emotionally Abused: Not on file    Physically Abused: Not on file    Sexually Abused: Not on file   Housing Stability:     Unable to Pay for Housing in the Last Year: Not on file    Number of Places Lived in the Last Year: Not on file    Unstable Housing in the Last Year: Not on file       Current Outpatient Medications on File Prior to Visit   Medication Sig Dispense Refill    tiotropium (SPIRIVA RESPIMAT) 2.5 MCG/ACT AERS inhaler Inhale 2 puffs into the lungs daily 3 each 3    diclofenac sodium (VOLTAREN) 1 % GEL Apply 4 g topically 4 times daily 240 g 1    rOPINIRole (REQUIP) 0.5 MG tablet Take one tablet po qhs 90 tablet 3    amitriptyline (ELAVIL) 10 MG tablet TAKE TWO TABLETS BY MOUTH ONCE DAILY AT NIGHT 180 tablet 3    Cholecalciferol (VITAMIN D) 50 MCG (2000 UT) CAPS capsule Take by mouth daily      folic acid (FOLVITE) 1 MG tablet Take 1 tablet by mouth daily 90 tablet 3    cyanocobalamin ([employer] VITAMIN B12) 1000 MCG tablet Take 1 tablet by mouth daily 90 tablet 3    SYNTHROID 88 MCG tablet Take one tablet po once daily 90 tablet 3    naloxone 4 MG/0.1ML LIQD nasal spray 1 spray by Nasal route as needed for Opioid Reversal 1 each 0    lidocaine (LMX) 4 % cream Apply a half dollar sized amount to intact skin topically up to twice daily as needed for pain 45 g 1    losartan (COZAAR) 50 MG tablet Take 50 mg by mouth daily      albuterol sulfate  (90 Base) MCG/ACT inhaler Inhale 1 puff into the lungs every 6 hours as needed for Wheezing or Shortness of Breath 18 g 3    Sucralfate (CARAFATE PO) Carafate      furosemide (LASIX) 20 MG tablet Lasix 20 mg tablet   TAKE 1 TABLET BY MOUTH ONCE DAILY AS NEEDED      esomeprazole (NEXIUM) 40 MG delayed release capsule Take 1 capsule po 30 minutes before your largest meal       No current facility-administered medications on file prior to visit. Pt presents today for a f/u of chronic knee pain. Pain is worse with movement. Says she has not been walking for about a year. Has an aide part time. Says she is able to transfer. Pt feels pain level and functioning improves with prescribed medications and is able to perform ADLs. Pt feels that walking aggravates the pain. Pt denies radiating numbness and tingling.  , he was terminal, that is why she wasn't able to keep her appointments. She didn't have time to do physical therapy. She declines MRI due to claustrophobia, doesn't want to take oral anxiolytics and doesn't want to be put to sleep for MRI either. Voltaren helped her. Didn't do knee injections.  Got knee braces but says they dont fit her legs. Using norco and diclofenac gel. Review of Systems   Constitutional: Negative for fever. HENT: Negative for congestion and sore throat. Respiratory: Negative for shortness of breath. Gastrointestinal: Negative for abdominal pain. Genitourinary: Negative for difficulty urinating. Musculoskeletal: Positive for arthralgias. Neurological: Negative for speech difficulty and headaches. Hematological: Negative for adenopathy. Psychiatric/Behavioral: Negative for agitation. All other systems reviewed and are negative. Objective:     Vitals:  Temp 97.7 °F (36.5 °C)   Ht 5' 5\" (1.651 m)   Wt 208 lb (94.3 kg)   BMI 34.61 kg/m² Pain Score:   9      Physical Exam  Vitals and nursing note reviewed. Pt is alert and oriented x 3. Recent and remote memory is intact. Mood, judgement and affect are normal.  No signs of distress or SOB noted. Visualized skin intact. Sensation intact to light touch. BL knee with decreased ROM. Tenderness noted with palpation. Mild swelling noted with arthritic deformity noted. Crepitus with ROM. Gait antalgic. Able to stand unassisted, leans on table for balance. Shuffling steps. Assessment:      Diagnosis Orders   1. Bilateral primary osteoarthritis of knee  Ambulatory referral to Home Health    HYDROcodone-acetaminophen (NORCO) 5-325 MG per tablet   2. Chronic, continuous use of opioids  HYDROcodone-acetaminophen (NORCO) 5-325 MG per tablet   3. Encounter for monitoring opioid maintenance therapy  HYDROcodone-acetaminophen (NORCO) 5-325 MG per tablet       Plan:          Orders Placed This Encounter   Medications    HYDROcodone-acetaminophen (NORCO) 5-325 MG per tablet     Sig: Take 1 tablet by mouth daily as needed for Pain for up to 30 days. Dispense:  30 tablet     Refill:  0     Reduce doses taken as pain becomes manageable       Orders Placed This Encounter   Procedures    Ambulatory referral to [address] [place] [address] Eleazar Hayes     Referral Priority:   Routine     Referral Type:   Home Health Care     Referral Reason:   Continuity of Care     Number of Visits Requested:   1           Discussed options with the patient today. Home health PT ordered, patient with limited mobility. Patient has trouble being complaint for treatments due to transportation issues and difficulty leaving the house. They will return in about a month for knee injections to reduce quantity of appointments.  All questions were answered. Pt verbalized understanding and agrees with above plan. Utox reviewed and appropriate from 12/10/21. Narcan sent 12/10/21. Will continue medications for chronic pain as they help pt function with ADL and improve quality of life. Discussed possible risks of opiate medication with pt, including but not limited to, constipation, nausea or vomiting, sedation, urinary retention, dependence and possible addiction. Pt agrees to use medication as directed. Pt advised to not use opiates while driving or operating heavy equipment, or in situations where pt may harm him/herself or others. Pt is aware that while on narcotics, pt needs to be seen monthly to reassess pain and need for continued medication. NDP reviewed. OARRS was not reviewed. This NP saw pt under direct supervision of Dr. mG Anders. Follow up:  Return in about 4 weeks (around 5/12/2022) for review meds and reassess pain.     MARILYN Newsome - CNP

## 2022-04-22 LAB — NONINV COLON CA DNA+OCC BLD SCRN STL QL: NEGATIVE

## 2022-05-17 ENCOUNTER — PROCEDURE VISIT (OUTPATIENT)
Dept: PAIN MANAGEMENT | Age: 87
End: 2022-05-17
Payer: COMMERCIAL

## 2022-05-17 DIAGNOSIS — Z79.891 ENCOUNTER FOR MONITORING OPIOID MAINTENANCE THERAPY: ICD-10-CM

## 2022-05-17 DIAGNOSIS — M17.0 BILATERAL PRIMARY OSTEOARTHRITIS OF KNEE: Primary | ICD-10-CM

## 2022-05-17 DIAGNOSIS — Z51.81 ENCOUNTER FOR MONITORING OPIOID MAINTENANCE THERAPY: ICD-10-CM

## 2022-05-17 DIAGNOSIS — F11.90 CHRONIC, CONTINUOUS USE OF OPIOIDS: ICD-10-CM

## 2022-05-17 DIAGNOSIS — F11.99 OPIOID USE, UNSPECIFIED WITH UNSPECIFIED OPIOID-INDUCED DISORDER (HCC): ICD-10-CM

## 2022-05-17 PROCEDURE — 77002 NEEDLE LOCALIZATION BY XRAY: CPT | Performed by: PHYSICAL MEDICINE & REHABILITATION

## 2022-05-17 PROCEDURE — 20610 DRAIN/INJ JOINT/BURSA W/O US: CPT | Performed by: PHYSICAL MEDICINE & REHABILITATION

## 2022-05-17 RX ORDER — LIDOCAINE HYDROCHLORIDE 10 MG/ML
10 INJECTION, SOLUTION INFILTRATION; PERINEURAL ONCE
Status: COMPLETED | OUTPATIENT
Start: 2022-05-17 | End: 2022-05-17

## 2022-05-17 RX ORDER — HYDROCODONE BITARTRATE AND ACETAMINOPHEN 5; 325 MG/1; MG/1
1 TABLET ORAL DAILY PRN
Qty: 30 TABLET | Refills: 0 | Status: SHIPPED | OUTPATIENT
Start: 2022-05-17 | End: 2022-08-23 | Stop reason: SDUPTHER

## 2022-05-17 RX ORDER — BETAMETHASONE SODIUM PHOSPHATE AND BETAMETHASONE ACETATE 3; 3 MG/ML; MG/ML
12 INJECTION, SUSPENSION INTRA-ARTICULAR; INTRALESIONAL; INTRAMUSCULAR; SOFT TISSUE ONCE
Status: COMPLETED | OUTPATIENT
Start: 2022-05-17 | End: 2022-05-17

## 2022-05-17 RX ADMIN — BETAMETHASONE SODIUM PHOSPHATE AND BETAMETHASONE ACETATE 12 MG: 3; 3 INJECTION, SUSPENSION INTRA-ARTICULAR; INTRALESIONAL; INTRAMUSCULAR; SOFT TISSUE at 15:41

## 2022-05-17 RX ADMIN — LIDOCAINE HYDROCHLORIDE 10 ML: 10 INJECTION, SOLUTION INFILTRATION; PERINEURAL at 15:41

## 2022-05-17 RX ADMIN — Medication 0.5 MEQ: at 15:41

## 2022-05-17 NOTE — PROGRESS NOTES
Knee Intraarticular Corticosteroid Joint Injection         Patient Name: Desmond Montgomery   : 1935     Date: 2022   Provider: Silvana Link MD        PROCEDURE: Bilateral knee intraarticular corticosteroid joint injection under fluoroscopic guidance    INDICATIONS: Desmond Montgomery is a 82 y.o. female who presents with symptoms and physical exam findings consistent with bilateral knee osteoarthritis. She has had persistent pain that limits her activities of daily living such as lower body dressing. The pain is persistent despite conservative measures including home exercise program. Given her symptoms, physical exam findings, impairment in activities of daily living, and lack of response to conservative measures, consideration for Bilateral knee corticosteroid joint injection under fluoroscopic guidance was given. Discussed the risks including but not limited to bleeding, infection, worsened pain, damage to surrounding structures, side effects, toxicity, allergic reactions to medications used, immune and stress-response dysfunction, fat necrosis, decreased bone mineralization, cartilage loss, increased fracture risk, avascular necrosis, skin pigmentation changes, blood sugar elevation, need for surgery, premature damage or degeneration of the joint, as well as catastrophic injury such as vision loss, paralysis, stroke, bowel or bladder incontinence, ventilator dependence, loss of use of the joint and/or extremity, and death. Discussed the risks, benefits, alternative procedures, and alternatives to the procedure including no procedure at all. Discussed that we cannot undo any permanent neurologic or orthopaedic damage or change the course of any underlying disease. After thorough discussion, patient expressed understanding and willingness to proceed. Written consent was obtained and is in the chart. Verbal consent to proceed was obtained. DESCRIPTION OF PROCEDURE:  The sites were marked.  A time-out was performed. Fluoroscopy was used to visualize pertinent anatomy and identify a tract free of any critical neurovascular structures. The sites were prepped with Betadine three times and maintained in a sterile manner throughout the entire procedure. Attention was turned to the left knee. The skin and subcutaneous tissue was anesthetized using a 27-gauge 1.5 inch needle with 1 mL of 1% preservative-free lidocaine and sodium bicarbonate. The needle was then advanced under intermittent fluoroscopic guidance into the tibiofemoral joint space. The joint space was entered. Aspiration for blood was negative throughout the entire procedure. AP and lateral views were obtained. Lateral views were limited secondary to patient positioning. Then a 3 mL injectate containing 1 mL of 6 mg of betamethasone and 2 mL of 1% preservative-free lidocaine was injected without resistance or difficulty. The needle was flushed and removed. The site was hemostatic. The site was cleaned and appropriately dressed. Attention was turned to the right knee. The skin and subcutaneous tissue was anesthetized using a 27-gauge 1.5 inch needle with 1 mL of 1% preservative-free lidocaine and sodium bicarbonate. The needle was then advanced under intermittent fluoroscopic guidance into the tibiofemoral joint space. The joint space was entered. Aspiration for blood was negative throughout the entire procedure. AP and lateral views were obtained. Lateral views were limited secondary to patient positioning. Then a 3 mL injectate containing 1 mL of 6 mg of betamethasone and 2 mL of 1% preservative-free lidocaine was injected without resistance or difficulty. A total of 12 mg of betamethasone was used for today's procedure. The needle was flushed and removed. The site was hemostatic. The site was cleaned and appropriately dressed. The patient tolerated the procedure well, there were no immediate post procedure complications.  Post procedure instructions were given. The patient will follow-up as previously instructed.         [address]  Phone [phone]/N [phone]

## 2022-05-17 NOTE — PROGRESS NOTES
OARRS unable to review, unclear on reason    -Continue Norco 5/325 mg daily prn #30 no ref start 5/17-6/16/22  -Keep pain management follow up       Controlled Substance Monitoring:    Acute and Chronic Pain Monitoring:   RX Monitoring 5/17/2022   Periodic Controlled Substance Monitoring Obtaining appropriate analgesic effect of treatment.

## 2022-05-18 ENCOUNTER — TELEPHONE (OUTPATIENT)
Dept: PAIN MANAGEMENT | Age: 87
End: 2022-05-18

## 2022-05-18 ENCOUNTER — TELEPHONE (OUTPATIENT)
Dept: NEUROSURGERY | Age: 87
End: 2022-05-18

## 2022-05-18 NOTE — TELEPHONE ENCOUNTER
Hydrocodone-acetaminophen 5-325 mg prior authorization submitted on line (Cover My Meds[employer] clinical uploaded, authorization pending, Key: [id number] - [place] Case ID: [id number] help? Call us at [phone]  OutcomeApprovedtoday  FOSOUQ:[id number];GFNELW:[id number]; Review Type:Prior Auth; Coverage Start Date:04/18/2022; Coverage End Date:05/18/2023

## 2022-05-19 NOTE — TELEPHONE ENCOUNTER
Hydrocodone-acetaminophen 5-325 mg prior authorization submitted on line (Cover My MedsO clinical uploaded, authorization pending, Key: [id number] - [place] Case ID: [id number] help? Call us at [phone]  Outcome Approved today  CaseId:[id number];Status:Approved; Review Type:Prior Auth; Coverage Start Date:04/18/2022; Coverage End Date:05/18/2023

## 2022-05-23 ENCOUNTER — TELEPHONE (OUTPATIENT)
Dept: FAMILY MEDICINE CLINIC | Age: 87
End: 2022-05-23

## 2022-05-23 NOTE — TELEPHONE ENCOUNTER
Spoke with patient and she stated she was told by [facility] said they didn't have order. Called their office, patient switched from Medicare to [address] [place] [address] so they are working on new approval. New forms will be faxed over.

## 2022-06-02 RX ORDER — LOSARTAN POTASSIUM 50 MG/1
50 TABLET ORAL DAILY
Qty: 90 TABLET | Refills: 3 | Status: SHIPPED | OUTPATIENT
Start: 2022-06-02

## 2022-06-02 NOTE — TELEPHONE ENCOUNTER
pt requesting medication refill.  Please approve or deny this request.    Rx requested:  Requested Prescriptions     Pending Prescriptions Disp Refills    losartan (COZAAR) 50 mg tablet 30 tablet      Sig: Take 1 tablet by mouth daily         Last Office Visit:   3/11/2022      Next Visit Date:  Future Appointments   Date Time Provider Jaylin Brownlee   6/16/2022 11:30 AM Renee Godoy  [place], [address]   6/21/2022  3:00 PM MARILYN Huizar - [address]

## 2022-06-14 NOTE — TELEPHONE ENCOUNTER
Please contact Michelle Hernández at [facility] for the motorized chair [phone] and their fax is [phone]. Office notes need to be faxed from the last 60 days.

## 2022-06-16 ENCOUNTER — OFFICE VISIT (OUTPATIENT)
Dept: FAMILY MEDICINE CLINIC | Age: 87
End: 2022-06-16
Payer: COMMERCIAL

## 2022-06-16 ENCOUNTER — TELEPHONE (OUTPATIENT)
Dept: FAMILY MEDICINE CLINIC | Age: 87
End: 2022-06-16

## 2022-06-16 VITALS
TEMPERATURE: 97.3 F | SYSTOLIC BLOOD PRESSURE: 118 MMHG | WEIGHT: 203 LBS | HEART RATE: 72 BPM | BODY MASS INDEX: 33.78 KG/M2 | DIASTOLIC BLOOD PRESSURE: 64 MMHG | OXYGEN SATURATION: 97 %

## 2022-06-16 DIAGNOSIS — E03.9 HYPOTHYROIDISM, UNSPECIFIED TYPE: ICD-10-CM

## 2022-06-16 DIAGNOSIS — E03.9 HYPOTHYROIDISM, UNSPECIFIED TYPE: Primary | ICD-10-CM

## 2022-06-16 LAB
ALBUMIN SERPL-MCNC: 4.3 G/DL (ref 3.5–4.6)
ALP BLD-CCNC: 98 U/L (ref 40–130)
ALT SERPL-CCNC: 14 U/L (ref 0–33)
AST SERPL-CCNC: 25 U/L (ref 0–35)
BASOPHILS ABSOLUTE: 0.1 K/UL (ref 0–0.2)
BASOPHILS RELATIVE PERCENT: 0.9 %
BILIRUB SERPL-MCNC: 0.3 MG/DL (ref 0.2–0.7)
BILIRUBIN DIRECT: <0.2 MG/DL (ref 0–0.4)
BILIRUBIN, INDIRECT: NORMAL MG/DL (ref 0–0.6)
EOSINOPHILS ABSOLUTE: 0.2 K/UL (ref 0–0.7)
EOSINOPHILS RELATIVE PERCENT: 3 %
HCT VFR BLD CALC: 40.5 % (ref 37–47)
HEMOGLOBIN: 12.7 G/DL (ref 12–16)
LYMPHOCYTES ABSOLUTE: 3.4 K/UL (ref 1–4.8)
LYMPHOCYTES RELATIVE PERCENT: 53.6 %
MCH RBC QN AUTO: 27.1 PG (ref 27–31.3)
MCHC RBC AUTO-ENTMCNC: 31.4 % (ref 33–37)
MCV RBC AUTO: 86.3 FL (ref 82–100)
MONOCYTES ABSOLUTE: 0.6 K/UL (ref 0.2–0.8)
MONOCYTES RELATIVE PERCENT: 9 %
NEUTROPHILS ABSOLUTE: 2.1 K/UL (ref 1.4–6.5)
NEUTROPHILS RELATIVE PERCENT: 33.5 %
PDW BLD-RTO: 14.5 % (ref 11.5–14.5)
PLATELET # BLD: 222 K/UL (ref 130–400)
RBC # BLD: 4.69 M/UL (ref 4.2–5.4)
T4 FREE: 1.1 NG/DL (ref 0.84–1.68)
TOTAL PROTEIN: 7.1 G/DL (ref 6.3–8)
TSH REFLEX: 8.54 UIU/ML (ref 0.44–3.86)
WBC # BLD: 6.3 K/UL (ref 4.8–10.8)

## 2022-06-16 PROCEDURE — 99214 OFFICE O/P EST MOD 30 MIN: CPT | Performed by: INTERNAL MEDICINE

## 2022-06-16 PROCEDURE — 1036F TOBACCO NON-USER: CPT | Performed by: INTERNAL MEDICINE

## 2022-06-16 PROCEDURE — G8427 DOCREV CUR MEDS BY ELIG CLIN: HCPCS | Performed by: INTERNAL MEDICINE

## 2022-06-16 PROCEDURE — G8400 PT W/DXA NO RESULTS DOC: HCPCS | Performed by: INTERNAL MEDICINE

## 2022-06-16 PROCEDURE — 1123F ACP DISCUSS/DSCN MKR DOCD: CPT | Performed by: INTERNAL MEDICINE

## 2022-06-16 PROCEDURE — 1090F PRES/ABSN URINE INCON ASSESS: CPT | Performed by: INTERNAL MEDICINE

## 2022-06-16 PROCEDURE — G8417 CALC BMI ABV UP PARAM F/U: HCPCS | Performed by: INTERNAL MEDICINE

## 2022-06-16 RX ORDER — FUROSEMIDE 20 MG/1
TABLET ORAL
Qty: 30 TABLET | Refills: 5 | Status: SHIPPED | OUTPATIENT
Start: 2022-06-16

## 2022-06-16 ASSESSMENT — ENCOUNTER SYMPTOMS
SHORTNESS OF BREATH: 0
BACK PAIN: 0
EYE PAIN: 0
ABDOMINAL PAIN: 0

## 2022-06-16 NOTE — PROGRESS NOTES
Subjective:      Patient ID: Dejuan Frost is a 82 y.o. female who presents today with:  Chief Complaint   Patient presents with    Follow-up       HPI     Follow up     Past Medical History:   Diagnosis Date    Arthritis     Arthritis of both knees     Chronic back pain     [facility] ([facility] disease) [facility]     Depression     Deviated septum     per patient     Emphysema (subcutaneous) (surgical) resulting from a procedure     Fibromyalgia     Irritable bowel syndrome     Lyme arthritis ([facility]     Neuropathy     Pregnancy, GI problems     Pulmonary fibrosis ([facility]     per patient     Restless leg syndrome     Spinal stenosis     Urinary incontinence      No past surgical history on file. Social History     Socioeconomic History    Marital status:      Spouse name: Not on file    Number of children: Not on file    Years of education: Not on file    Highest education level: Not on file   Occupational History    Not on file   Tobacco Use    Smoking status: Never Smoker    Smokeless tobacco: Never Used   Substance and Sexual Activity    Alcohol use: No    Drug use: No    Sexual activity: Not Currently   Other Topics Concern    Not on file   Social History Narrative    Not on file     Social Determinants of Health     Financial Resource Strain: Low Risk     Difficulty of Paying Living Expenses: Not hard at all   Food Insecurity: No Food Insecurity    Worried About [facility] in the Last Year: Never true    [facility] in the Last Year: Never true   Transportation Needs:     Lack of Transportation (Medical): Not on file    Lack of Transportation (Non-Medical):  Not on file   Physical Activity:     Days of Exercise per Week: Not on file    Minutes of Exercise per Session: Not on file   Stress:     Feeling of Stress : Not on file   Social Connections:     Frequency of Communication with Friends and Family: Not on file    Frequency of Social Gatherings with Friends and Family: Not on file    Attends Mandae[employer] Services: Not on file    Active Member of Clubs or Organizations: Not on file    Attends Club or Organization Meetings: Not on file    Marital Status: Not on file   Intimate Partner Violence:     Fear of Current or Ex-Partner: Not on file    Emotionally Abused: Not on file    Physically Abused: Not on file    Sexually Abused: Not on file   Housing Stability:     Unable to Pay for Housing in the Last Year: Not on file    Number of [place] in the Last Year: Not on file    Unstable Housing in the Last Year: Not on file     Allergies   Allergen Reactions    Azithromycin Nausea And Vomiting    Ceftin  [Cefuroxime Axetil] Nausea And Vomiting     Current Outpatient Medications on File Prior to Visit   Medication Sig Dispense Refill    losartan (COZAAR) 50 mg tablet Take 1 tablet by mouth daily 90 tablet 3    HYDROcodone-acetaminophen (NORCO) 5-325 MG per tablet Take 1 tablet by mouth daily as needed for Pain for up to 30 days.  30 tablet 0    tiotropium (SPIRIVA RESPIMAT) 2.5 MCG/ACT AERS inhaler Inhale 2 puffs into the lungs daily 3 each 3    rOPINIRole (REQUIP) 0.5 MG tablet Take one tablet po qhs 90 tablet 3    amitriptyline (ELAVIL) 10 MG tablet TAKE TWO TABLETS BY MOUTH ONCE DAILY AT NIGHT 180 tablet 3    Cholecalciferol (VITAMIN D) 50 MCG (2000 UT) CAPS capsule Take by mouth daily      folic acid (FOLVITE) 1 MG tablet Take 1 tablet by mouth daily 90 tablet 3    cyanocobalamin ([employer]S VITAMIN B12) 1000 MCG tablet Take 1 tablet by mouth daily 90 tablet 3    SYNTHROID 88 MCG tablet Take one tablet po once daily 90 tablet 3    naloxone 4 MG/0.1ML LIQD nasal spray 1 spray by Nasal route as needed for Opioid Reversal 1 each 0    lidocaine (LMX) 4 % cream Apply a half dollar sized amount to intact skin topically up to twice daily as needed for pain 45 g 1    albuterol sulfate  (90 Base) MCG/ACT inhaler Inhale 1 puff into the lungs every 6 hours as needed for Wheezing or Shortness of Breath 18 g 3    Sucralfate (CARAFATE PO) Carafate      esomeprazole (NEXIUM) 40 MG delayed release capsule Take 1 capsule po 30 minutes before your largest meal      diclofenac sodium (VOLTAREN) 1 % GEL Apply 4 g topically 4 times daily 240 g 1     No current facility-administered medications on file prior to visit. I have personally reviewed the ROS, PMH, PFH, and social history     Review of Systems   Constitutional: Negative for chills and fever. HENT: Negative for congestion. Eyes: Negative for pain. Respiratory: Negative for shortness of breath. Cardiovascular: Negative for chest pain. Gastrointestinal: Negative for abdominal pain. Genitourinary: Negative for hematuria. Musculoskeletal: Negative for back pain. Allergic/Immunologic: Negative for immunocompromised state. Neurological: Negative for headaches. Psychiatric/Behavioral: Negative for hallucinations. Objective:   /64   Pulse 72   Temp 97.3 °F (36.3 °C) (Temporal)   Wt 203 lb (92.1 kg)   SpO2 97%   BMI 33.78 kg/m²     Physical Exam  Constitutional:       General: She is not in acute distress. Appearance: Normal appearance. She is not ill-appearing, toxic-appearing or diaphoretic. HENT:      Head: Normocephalic. Neck:      Vascular: No carotid bruit. Cardiovascular:      Rate and Rhythm: Normal rate and regular rhythm. Pulses: Normal pulses. Heart sounds: Normal heart sounds. No murmur heard. No friction rub. No gallop. Pulmonary:      Effort: Pulmonary effort is normal. No respiratory distress. Breath sounds: Normal breath sounds. No wheezing, rhonchi or rales. Abdominal:      General: Abdomen is flat. There is no distension. Palpations: Abdomen is soft. Tenderness: There is no abdominal tenderness. There is no right CVA tenderness, left CVA tenderness, guarding or rebound.    Musculoskeletal: Cervical back: Neck supple. Right lower leg: No edema. Left lower leg: No edema. Skin:     General: Skin is warm. Findings: No erythema or rash. Neurological:      Mental Status: She is alert. Psychiatric:         Mood and Affect: Mood normal.           Assessment:       Diagnosis Orders   1. Hypothyroidism, unspecified type  TSH with Reflex    T4, Free    Hepatic Function Panel    CBC with Auto Differential         Plan:     vc.  KEEP pain mgmt fu   (Bilateral scarring/fibrosis Moderate to large hiatal hernia.) (Doesn't want referrals)   Continue chronic medications  mary NOT Completed              Orders Placed This Encounter   Procedures    TSH with Reflex     Standing Status:   Future     Number of Occurrences:   1     Standing Expiration Date:   6/16/2023    T4, Free     Standing Status:   Future     Number of Occurrences:   1     Standing Expiration Date:   6/16/2023    Hepatic Function Panel     Standing Status:   Future     Number of Occurrences:   1     Standing Expiration Date:   6/16/2023    CBC with Auto Differential     Standing Status:   Future     Number of Occurrences:   1     Standing Expiration Date:   6/16/2023     Orders Placed This Encounter   Medications    furosemide (LASIX) 20 MG tablet     Sig: TAKE 1 TABLET BY MOUTH ONCE DAILY AS NEEDED FOR SWELLING     Dispense:  30 tablet     Refill:  5   doesn't want any covid booster. Echo not done   covid Booster doesn't want it  Refusing statin (From before)   Wants to wait on awv  Will probably not complete MRI  (refusing mri/surgery)   Doesn't want any investigation of her back pain   30 mins spent   If anything should change or worsen call ASAP, don't wait for next scheduled appointment. Return in about 4 months (around 10/16/2022) for Chronic condition management/appointment, worsening symptoms, call ASAP for appointment.       Henry Dickerson MD

## 2022-06-16 NOTE — TELEPHONE ENCOUNTER
Please call grand dtr if on hipaa and or patient  Does she want any referrals for preventative care (checking for cancer), ie, mammogram, colonoscopy, etc.

## 2022-06-20 RX ORDER — LEVOTHYROXINE SODIUM 0.1 MG/1
100 TABLET ORAL DAILY
COMMUNITY
End: 2022-06-20 | Stop reason: SDUPTHER

## 2022-06-21 ENCOUNTER — OFFICE VISIT (OUTPATIENT)
Dept: PAIN MANAGEMENT | Age: 87
End: 2022-06-21
Payer: COMMERCIAL

## 2022-06-21 VITALS
HEART RATE: 75 BPM | HEIGHT: 66 IN | TEMPERATURE: 97.1 F | OXYGEN SATURATION: 97 % | RESPIRATION RATE: 18 BRPM | WEIGHT: 203 LBS | BODY MASS INDEX: 32.62 KG/M2

## 2022-06-21 DIAGNOSIS — M17.0 BILATERAL PRIMARY OSTEOARTHRITIS OF KNEE: Primary | ICD-10-CM

## 2022-06-21 DIAGNOSIS — F11.90 CHRONIC, CONTINUOUS USE OF OPIOIDS: ICD-10-CM

## 2022-06-21 DIAGNOSIS — Z51.81 ENCOUNTER FOR MONITORING OPIOID MAINTENANCE THERAPY: ICD-10-CM

## 2022-06-21 DIAGNOSIS — Z79.891 ENCOUNTER FOR MONITORING OPIOID MAINTENANCE THERAPY: ICD-10-CM

## 2022-06-21 PROCEDURE — G8400 PT W/DXA NO RESULTS DOC: HCPCS | Performed by: NURSE PRACTITIONER

## 2022-06-21 PROCEDURE — 99214 OFFICE O/P EST MOD 30 MIN: CPT | Performed by: NURSE PRACTITIONER

## 2022-06-21 PROCEDURE — 1123F ACP DISCUSS/DSCN MKR DOCD: CPT | Performed by: NURSE PRACTITIONER

## 2022-06-21 PROCEDURE — 1036F TOBACCO NON-USER: CPT | Performed by: NURSE PRACTITIONER

## 2022-06-21 PROCEDURE — 1090F PRES/ABSN URINE INCON ASSESS: CPT | Performed by: NURSE PRACTITIONER

## 2022-06-21 PROCEDURE — G8427 DOCREV CUR MEDS BY ELIG CLIN: HCPCS | Performed by: NURSE PRACTITIONER

## 2022-06-21 PROCEDURE — G8417 CALC BMI ABV UP PARAM F/U: HCPCS | Performed by: NURSE PRACTITIONER

## 2022-06-21 RX ORDER — LEVOTHYROXINE SODIUM 0.1 MG/1
100 TABLET ORAL DAILY
Qty: 30 TABLET | Refills: 1 | Status: SHIPPED | OUTPATIENT
Start: 2022-06-21 | End: 2022-10-14 | Stop reason: DRUGHIGH

## 2022-06-21 ASSESSMENT — ENCOUNTER SYMPTOMS
SORE THROAT: 0
ABDOMINAL PAIN: 0
SHORTNESS OF BREATH: 0

## 2022-06-21 NOTE — PROGRESS NOTES
Cindy Li  (1935)    6/21/2022    Subjective:     Cindy Li is 82 y.o. female who complains today of:    Chief Complaint   Patient presents with    Knee Pain         Allergies:  Azithromycin and Ceftin  [cefuroxime axetil]    Past Medical History:   Diagnosis Date    Arthritis     Arthritis of both knees     Chronic back pain     COPD (chronic obstructive pulmonary disease) (HCC)     Depression     Deviated septum     per patient     Emphysema (subcutaneous) (surgical) resulting from a procedure     Fibromyalgia     Irritable bowel syndrome     [facility] arthritis ([facility] [address]     Neuropathy     Pregnancy, GI problems     Pulmonary fibrosis ([facility] [address]     per patient     Restless leg syndrome     Spinal stenosis     Urinary incontinence      History reviewed. No pertinent surgical history.   Family History   Problem Relation Age of Onset    High Cholesterol Mother     Arthritis Mother     High Cholesterol Father     Heart Failure Father     Arthritis Father     Heart Disease Father     High Cholesterol Sister     Heart Failure Sister     Heart Failure Maternal Grandmother     High Cholesterol Maternal Grandmother     Heart Failure Paternal Grandfather     High Cholesterol Paternal Grandfather      Social History     Socioeconomic History    Marital status:      Spouse name: Not on file    Number of children: Not on file    Years of education: Not on file    Highest education level: Not on file   Occupational History    Not on file   Tobacco Use    Smoking status: Never Smoker    Smokeless tobacco: Never Used   Substance and Sexual Activity    Alcohol use: No    Drug use: No    Sexual activity: Not Currently   Other Topics Concern    Not on file   Social History Narrative    Not on file     Social Determinants of Health     Financial Resource Strain: Low Risk     Difficulty of Paying Living Expenses: Not hard at all   Food Insecurity: No Food Insecurity    Worried About Running Out of Food in the Last Year: Never true    Ran Out of Food in the Last Year: Never true   Transportation Needs:     Lack of Transportation (Medical): Not on file    Lack of Transportation (Non-Medical):  Not on file   Physical Activity:     Days of Exercise per Week: Not on file    Minutes of Exercise per Session: Not on file   Stress:     Feeling of Stress : Not on file   Social Connections:     Frequency of Communication with Friends and Family: Not on file    Frequency of Social Gatherings with Friends and Family: Not on file    Attends Gnosticist Services: Not on file    Active Member of Clubs or Organizations: Not on file    Attends Club or Organization Meetings: Not on file    Marital Status: Not on file   Intimate Partner Violence:     Fear of Current or Ex-Partner: Not on file    Emotionally Abused: Not on file    Physically Abused: Not on file    Sexually Abused: Not on file   Housing Stability:     Unable to Pay for Housing in the Last Year: Not on file    Number of Places Lived in the Last Year: Not on file    Unstable Housing in the Last Year: Not on file       Current Outpatient Medications on File Prior to Visit   Medication Sig Dispense Refill    levothyroxine (SYNTHROID) 100 MCG tablet Take 100 mcg by mouth Daily      furosemide (LASIX) 20 MG tablet TAKE 1 TABLET BY MOUTH ONCE DAILY AS NEEDED FOR SWELLING 30 tablet 5    losartan (COZAAR) 50 mg tablet Take 1 tablet by mouth daily 90 tablet 3    tiotropium (SPIRIVA RESPIMAT) 2.5 MCG/ACT AERS inhaler Inhale 2 puffs into the lungs daily 3 each 3    diclofenac sodium (VOLTAREN) 1 % GEL Apply 4 g topically 4 times daily 240 g 1    rOPINIRole (REQUIP) 0.5 MG tablet Take one tablet po qhs 90 tablet 3    amitriptyline (ELAVIL) 10 MG tablet TAKE TWO TABLETS BY MOUTH ONCE DAILY AT NIGHT 180 tablet 3    Cholecalciferol (VITAMIN D) 50 MCG (2000 UT) CAPS capsule Take by mouth daily      folic acid (FOLVITE) 1 MG tablet Take 1 tablet by mouth daily 90 tablet 3    cyanocobalamin ([employer]S VITAMIN B12) 1000 MCG tablet Take 1 tablet by mouth daily 90 tablet 3    SYNTHROID 88 MCG tablet Take one tablet po once daily 90 tablet 3    naloxone 4 MG/0.1ML LIQD nasal spray 1 spray by Nasal route as needed for Opioid Reversal 1 each 0    lidocaine (LMX) 4 % cream Apply a half dollar sized amount to intact skin topically up to twice daily as needed for pain 45 g 1    albuterol sulfate  (90 Base) MCG/ACT inhaler Inhale 1 puff into the lungs every 6 hours as needed for Wheezing or Shortness of Breath 18 g 3    Sucralfate (CARAFATE PO) Carafate      esomeprazole (NEXIUM) 40 MG delayed release capsule Take 1 capsule po 30 minutes before your largest meal       No current facility-administered medications on file prior to visit. Pt presents today for a f/u of chronic knee pain. Says they did get home PT, but it was stopped after 5 weeks. Injections helped somewhat. Knees are ok if shes sitting. Pain is worse with movement. Says she has not been walking for about a year. Has an aide part time. Says she is able to transfer. Pt feels pain level and functioning improves with prescribed medications and is able to perform ADLs. Pt feels that walking aggravates the pain. Pt denies radiating numbness and tingling.  , he was terminal, that is why she wasn't able to keep her appointments. She didn't have time to do physical therapy. She declines MRI due to claustrophobia, doesn't want to take oral anxiolytics and doesn't want to be put to sleep for MRI either. Voltaren helped her. Didn't do knee injections.  Got knee braces but says they dont fit her legs. Using norco and diclofenac gel. 22 BL knee inj      Review of Systems   Constitutional: Negative for fever. HENT: Negative for congestion and sore throat. Respiratory: Negative for shortness of breath. Gastrointestinal: Negative for abdominal pain. [facility]     Number of Visits Requested:   1    CHG DRUG/SUBSTANCE DEFINITIVE QUAL/QUANT NOS 7/MORE     Discussed options with the patient today. Continue [place]. Home health PT ordered, patient with limited mobility. She previously had 5 weeks of home PT and would like to continue, unable to review notes to see why it was stopped. Patient has trouble being complaint for treatments due to transportation issues and difficulty leaving the house. Routine ODS today. Roberta Mi today and yesterday. Per pharmacy  last fill date for Ilene Arteaga was 3/13. Per granddaughter that is incorrect and they have had many issues with [facility] not having the correct information. Regardless, the patient believes she has 28 pills at home and does not need a prescription today. They will call if they do need a prescription. All questions were answered. Pt verbalized understanding and agrees with above plan. OARRS NOT AVAILABLE FOR THIS PATIENT. Utox reviewed and appropriate from 12/10/21. Narcan sent 12/10/21.     Will continue medications for chronic pain as they help pt function with ADL and improve quality of life.  Discussed possible risks of opiate medication with pt, including but not limited to, constipation, nausea or vomiting, sedation, urinary retention, dependence and possible addiction. Pt agrees to use medication as directed. Pt advised to not use opiates while driving or operating heavy equipment, or in situations where pt may harm him/herself or others.  Pt is aware that while on narcotics, pt needs to be seen monthly to reassess pain and need for continued medication. NDP reviewed. OARRS was reviewed. This NP saw pt under direct supervision of Dr. Mary Alice Pike. Follow up:  Return in about 4 weeks (around 7/19/2022) for review meds and reassess pain.     Brannon Dc, MARILYN - CNP

## 2022-06-24 ENCOUNTER — TELEPHONE (OUTPATIENT)
Dept: FAMILY MEDICINE CLINIC | Age: 87
End: 2022-06-24

## 2022-07-25 ENCOUNTER — TELEPHONE (OUTPATIENT)
Dept: FAMILY MEDICINE CLINIC | Age: 87
End: 2022-07-25

## 2022-07-25 NOTE — TELEPHONE ENCOUNTER
Faustina Lugo, not on HIPAA, called inquiring about forms she dropped off to the office on 7/14 that need to be filled out and faxed back to [employer]. Please advise. Thank you.

## 2022-07-27 ENCOUNTER — TELEPHONE (OUTPATIENT)
Dept: FAMILY MEDICINE CLINIC | Age: 87
End: 2022-07-27

## 2022-07-27 NOTE — TELEPHONE ENCOUNTER
PT gave permission over phone to [address]    Please contact PT once the order is complete, states has been requesting this since Nov. Vandana Kaiser daughter plans on coming in to the office on 7/28 to follow up. See attached fax request-       PT calling for status update on wheel chair- order.  That needs faxed to [facility]

## 2022-07-28 NOTE — TELEPHONE ENCOUNTER
Spoke vanesa Arellano, [facility] and [facility] do not [facility] [place] . Spoke vanesa Moran need form filled out and signed and faxed back asap. Please advise if able to do. They said appt notes from 6/16/22 are suffice.

## 2022-08-09 ENCOUNTER — TELEPHONE (OUTPATIENT)
Dept: PRIMARY CARE CLINIC | Age: 87
End: 2022-08-09

## 2022-08-23 ENCOUNTER — OFFICE VISIT (OUTPATIENT)
Dept: PAIN MANAGEMENT | Age: 87
End: 2022-08-23
Payer: COMMERCIAL

## 2022-08-23 VITALS
SYSTOLIC BLOOD PRESSURE: 138 MMHG | DIASTOLIC BLOOD PRESSURE: 80 MMHG | TEMPERATURE: 96.5 F | HEIGHT: 65 IN | BODY MASS INDEX: 33.19 KG/M2 | WEIGHT: 199.2 LBS

## 2022-08-23 DIAGNOSIS — Z79.891 ENCOUNTER FOR MONITORING OPIOID MAINTENANCE THERAPY: ICD-10-CM

## 2022-08-23 DIAGNOSIS — F11.90 CHRONIC, CONTINUOUS USE OF OPIOIDS: ICD-10-CM

## 2022-08-23 DIAGNOSIS — M17.0 BILATERAL PRIMARY OSTEOARTHRITIS OF KNEE: Primary | ICD-10-CM

## 2022-08-23 DIAGNOSIS — Z51.81 ENCOUNTER FOR MONITORING OPIOID MAINTENANCE THERAPY: ICD-10-CM

## 2022-08-23 PROCEDURE — G8400 PT W/DXA NO RESULTS DOC: HCPCS | Performed by: NURSE PRACTITIONER

## 2022-08-23 PROCEDURE — 1090F PRES/ABSN URINE INCON ASSESS: CPT | Performed by: NURSE PRACTITIONER

## 2022-08-23 PROCEDURE — 99214 OFFICE O/P EST MOD 30 MIN: CPT | Performed by: NURSE PRACTITIONER

## 2022-08-23 PROCEDURE — 1036F TOBACCO NON-USER: CPT | Performed by: NURSE PRACTITIONER

## 2022-08-23 PROCEDURE — G8417 CALC BMI ABV UP PARAM F/U: HCPCS | Performed by: NURSE PRACTITIONER

## 2022-08-23 PROCEDURE — 1123F ACP DISCUSS/DSCN MKR DOCD: CPT | Performed by: NURSE PRACTITIONER

## 2022-08-23 PROCEDURE — G8427 DOCREV CUR MEDS BY ELIG CLIN: HCPCS | Performed by: NURSE PRACTITIONER

## 2022-08-23 RX ORDER — LIDOCAINE 40 MG/G
CREAM TOPICAL
Qty: 45 G | Refills: 2 | Status: SHIPPED | OUTPATIENT
Start: 2022-08-23

## 2022-08-23 RX ORDER — HYDROCODONE BITARTRATE AND ACETAMINOPHEN 5; 325 MG/1; MG/1
1 TABLET ORAL DAILY PRN
Qty: 30 TABLET | Refills: 0 | Status: SHIPPED | OUTPATIENT
Start: 2022-08-23 | End: 2022-10-04 | Stop reason: SDUPTHER

## 2022-08-23 ASSESSMENT — ENCOUNTER SYMPTOMS
ABDOMINAL PAIN: 0
SORE THROAT: 0
SHORTNESS OF BREATH: 0

## 2022-08-23 NOTE — PROGRESS NOTES
Kameron Damon  (1935)    8/23/2022    Subjective:     Kameron Damon is 82 y.o. 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 No pertinent surgical history.   Family History   Problem Relation Age of Onset    High Cholesterol Mother     Arthritis Mother     High Cholesterol Father     Heart Failure Father     Arthritis Father     Heart Disease Father     High Cholesterol Sister     Heart Failure Sister     Heart Failure Maternal Grandmother     High Cholesterol Maternal Grandmother     Heart Failure Paternal Grandfather     High Cholesterol Paternal Grandfather      Social History     Socioeconomic History    Marital status:      Spouse name: Not on file    Number of children: Not on file    Years of education: Not on file    Highest education level: Not on file   Occupational History    Not on file   Tobacco Use    Smoking status: Never    Smokeless tobacco: Never   Substance and Sexual Activity    Alcohol use: No    Drug use: No    Sexual activity: Not Currently   Other Topics Concern    Not on file   Social History Narrative    Not on file     Social Determinants of Health     Financial Resource Strain: Low Risk     Difficulty of Paying Living Expenses: Not hard at all   Food Insecurity: No Food Insecurity    Worried About Running Out of Food in the Last Year: Never true    Ran Out of Food in the Last Year: Never true   Transportation Needs: Not on file   Physical Activity: Not on file   Stress: Not on file   Social Connections: Not on file   Intimate Partner Violence: Not on file   Housing Stability: Not on file       Current Outpatient Medications on File Prior to Visit   Medication Sig Dispense Refill    levothyroxine (SYNTHROID) 100 MCG tablet Take 1 tablet by mouth Daily 30 tablet 1    furosemide (LASIX) 20 MG tablet TAKE 1 TABLET BY MOUTH ONCE DAILY AS NEEDED FOR SWELLING 30 tablet 5    losartan (COZAAR) 50 mg tablet Take 1 tablet by mouth daily 90 tablet 3    tiotropium (SPIRIVA RESPIMAT) 2.5 MCG/ACT AERS inhaler Inhale 2 puffs into the lungs daily 3 each 3    rOPINIRole (REQUIP) 0.5 MG tablet Take one tablet po qhs 90 tablet 3    amitriptyline (ELAVIL) 10 MG tablet TAKE TWO TABLETS BY MOUTH ONCE DAILY AT NIGHT 180 tablet 3    Cholecalciferol (VITAMIN D) 50 MCG (2000 UT) CAPS capsule Take by mouth daily      folic acid (FOLVITE) 1 MG tablet Take 1 tablet by mouth daily 90 tablet 3    cyanocobalamin ([employer]S VITAMIN B12) 1000 MCG tablet Take 1 tablet by mouth daily 90 tablet 3    naloxone 4 MG/0.1ML LIQD nasal spray 1 spray by Nasal route as needed for Opioid Reversal 1 each 0    albuterol sulfate  (90 Base) MCG/ACT inhaler Inhale 1 puff into the lungs every 6 hours as needed for Wheezing or Shortness of Breath 18 g 3    Sucralfate (CARAFATE PO) Carafate      esomeprazole (NEXIUM) 40 MG delayed release capsule Take 1 capsule po 30 minutes before your largest meal       No current facility-administered medications on file prior to visit. Pt presents today for a f/u of chronic knee pain. Says they did get home PT, but it was stopped after 5 weeks. Was reordered last OV, however patient and granddaughter says insurance will no longer pay for it. Was given some home exercises to work on. Injections helped somewhat.  Knees are ok if shes sitting. Pain is worse with movement. Says she has not been walking for about a year. Has an aide part time. Says she is able to transfer. Pt feels pain level and functioning improves with prescribed medications and is able to perform ADLs. Pt feels that walking aggravates the pain. Pt denies radiating numbness and tingling.  , he was terminal, that is why she wasn't able to keep her appointments. She didn't have time to do physical therapy. She declines MRI due to claustrophobia, doesn't want to take oral anxiolytics and doesn't want to be put to sleep for MRI either. Voltaren helped her. Didn't do knee injections. Got knee braces but says they dont fit her legs. Using norco and diclofenac gel. 22 BL knee inj      Review of Systems   Constitutional:  Negative for fever. HENT:  Negative for congestion and sore throat. Respiratory:  Negative for shortness of breath. Gastrointestinal:  Negative for abdominal pain. Genitourinary:  Negative for difficulty urinating. Musculoskeletal:  Positive for arthralgias. Neurological:  Negative for speech difficulty and headaches. Hematological:  Negative for adenopathy. Psychiatric/Behavioral:  Negative for agitation. Objective:     Vitals:  /80 (Site: Left Upper Arm, Position: Sitting)   Temp (!) 96.5 °F (35.8 °C)   Ht 5' 5\" (1.651 m)   Wt 199 lb 3.2 oz (90.4 kg)   BMI 33.15 kg/m² Pain Score:   8      Physical Exam  Vitals and nursing note reviewed. Pt is alert and oriented x 3. Recent and remote memory is intact. Mood, judgement and affect are normal.  No signs of distress or SOB noted. Visualized skin intact. Sensation intact to light touch. BL knee with decreased ROM. Tenderness noted with palpation. Mild swelling noted with arthritic deformity noted. Crepitus with ROM. Gait antalgic. Able to stand unassisted, leans on table for balance. Shuffling steps. Assessment:      Diagnosis Orders   1. Bilateral primary osteoarthritis of knee  diclofenac sodium (VOLTAREN) 1 % GEL    HYDROcodone-acetaminophen (NORCO) 5-325 MG per tablet    lidocaine (LMX) 4 % cream      2. Chronic, continuous use of opioids  HYDROcodone-acetaminophen (NORCO) 5-325 MG per tablet      3. Encounter for monitoring opioid maintenance therapy  HYDROcodone-acetaminophen (Norris Spurr) 5-325 MG per tablet          Plan:     Periodic Controlled Substance Monitoring: Possible medication side effects, risk of tolerance/dependence & alternative treatments discussed., No signs of potential drug abuse or diversion identified. , Assessed functional status., Obtaining appropriate analgesic effect of treatment. Vicki Donahue, APRN - CNP)    Orders Placed This Encounter   Medications    diclofenac sodium (VOLTAREN) 1 % GEL     Sig: Apply 4 g topically 4 times daily     Dispense:  240 g     Refill:  2    HYDROcodone-acetaminophen (NORCO) 5-325 MG per tablet     Sig: Take 1 tablet by mouth daily as needed for Pain for up to 30 days. Dispense:  30 tablet     Refill:  0     Reduce doses taken as pain becomes manageable    lidocaine (LMX) 4 % cream     Sig: Apply a half dollar sized amount to intact skin topically up to twice daily as needed for pain     Dispense:  45 g     Refill:  2       No orders of the defined types were placed in this encounter. Discussed options with the patient today. Continue [place]. Will reach out to DME to see if she can get knee braces that fit. The braces she previously received do not buckle at the top. Patient has trouble being complaint for treatments due to transportation issues and difficulty leaving the house. Last [place] RX sent in May. Patient uses sparingly. Will send another 30 tabs today and f/u in 6 weeks. All questions were answered. Pt verbalized understanding and agrees with above plan. OARRS NOT AVAILABLE FOR THIS PATIENT. Otox reviewed and appropriate from 6/24/22. Narcan sent 12/10/21.      Will continue medications for chronic pain as they help pt function with ADL and improve quality of life. Discussed possible risks of opiate medication with pt, including but not limited to, constipation, nausea or vomiting, sedation, urinary retention, dependence and possible addiction. Pt agrees to use medication as directed. Pt advised to not use opiates while driving or operating heavy equipment, or in situations where pt may harm him/herself or others. Pt is aware that while on narcotics, pt needs to be seen monthly to reassess pain and need for continued medication. NDP reviewed. OARRS was reviewed. This NP saw pt under direct supervision of Dr. Terry Aviles. Follow up:  Return in about 6 weeks (around 10/4/2022) for review meds and reassess pain.     Guanakito Chawla, APRN - CNP

## 2022-10-04 ENCOUNTER — OFFICE VISIT (OUTPATIENT)
Dept: PAIN MANAGEMENT | Age: 87
End: 2022-10-04
Payer: COMMERCIAL

## 2022-10-04 ENCOUNTER — TELEPHONE (OUTPATIENT)
Dept: PAIN MANAGEMENT | Age: 87
End: 2022-10-04

## 2022-10-04 VITALS
TEMPERATURE: 97.1 F | SYSTOLIC BLOOD PRESSURE: 126 MMHG | HEIGHT: 65 IN | BODY MASS INDEX: 33.32 KG/M2 | DIASTOLIC BLOOD PRESSURE: 78 MMHG | WEIGHT: 200 LBS

## 2022-10-04 DIAGNOSIS — Z79.891 ENCOUNTER FOR MONITORING OPIOID MAINTENANCE THERAPY: ICD-10-CM

## 2022-10-04 DIAGNOSIS — F11.90 CHRONIC, CONTINUOUS USE OF OPIOIDS: ICD-10-CM

## 2022-10-04 DIAGNOSIS — R29.898 WEAKNESS OF BOTH LOWER EXTREMITIES: ICD-10-CM

## 2022-10-04 DIAGNOSIS — Z51.81 ENCOUNTER FOR MONITORING OPIOID MAINTENANCE THERAPY: ICD-10-CM

## 2022-10-04 DIAGNOSIS — M17.0 BILATERAL PRIMARY OSTEOARTHRITIS OF KNEE: ICD-10-CM

## 2022-10-04 PROCEDURE — 1090F PRES/ABSN URINE INCON ASSESS: CPT | Performed by: NURSE PRACTITIONER

## 2022-10-04 PROCEDURE — G8427 DOCREV CUR MEDS BY ELIG CLIN: HCPCS | Performed by: NURSE PRACTITIONER

## 2022-10-04 PROCEDURE — G8400 PT W/DXA NO RESULTS DOC: HCPCS | Performed by: NURSE PRACTITIONER

## 2022-10-04 PROCEDURE — 99214 OFFICE O/P EST MOD 30 MIN: CPT | Performed by: NURSE PRACTITIONER

## 2022-10-04 PROCEDURE — G8417 CALC BMI ABV UP PARAM F/U: HCPCS | Performed by: NURSE PRACTITIONER

## 2022-10-04 PROCEDURE — 1036F TOBACCO NON-USER: CPT | Performed by: NURSE PRACTITIONER

## 2022-10-04 PROCEDURE — G8484 FLU IMMUNIZE NO ADMIN: HCPCS | Performed by: NURSE PRACTITIONER

## 2022-10-04 PROCEDURE — 1123F ACP DISCUSS/DSCN MKR DOCD: CPT | Performed by: NURSE PRACTITIONER

## 2022-10-04 RX ORDER — GABAPENTIN 100 MG/1
100 CAPSULE ORAL DAILY PRN
Qty: 30 CAPSULE | Refills: 0 | Status: SHIPPED | OUTPATIENT
Start: 2022-10-04 | End: 2022-11-03

## 2022-10-04 RX ORDER — HYDROCODONE BITARTRATE AND ACETAMINOPHEN 5; 325 MG/1; MG/1
1 TABLET ORAL DAILY PRN
Qty: 30 TABLET | Refills: 0 | Status: SHIPPED | OUTPATIENT
Start: 2022-10-04 | End: 2022-11-03

## 2022-10-04 ASSESSMENT — ENCOUNTER SYMPTOMS
ABDOMINAL PAIN: 0
SORE THROAT: 0
SHORTNESS OF BREATH: 0

## 2022-10-04 NOTE — TELEPHONE ENCOUNTER
LMOM FOR MACIEJ RUBIO WITH [employer] REGARDING AN INCOMING FAX REQUESTING Laureano JACQUELYN Mayorga 1874 SUIT TO FILL OUT FOR THE PATIENT TO GET A POWER WHEELCHAIR. PER SIRISHA, DR QURESHI ORDERED THE POWER WHEELCHAIR. SHE STATED SHE DOES NOT FEEL SHE SHOULD FILL THIS FORM OUT SINCE SHE IS NOT THE ORDERING PROVIDER. SHE STATED IF SHE WAS THE ORIGINAL ORDERING PROVIDER SHE WOULD FILL IT OUT. DOCUMENTS NEED TO BE FILLED OUT BY DR Maureen Edwards.

## 2022-10-04 NOTE — TELEPHONE ENCOUNTER
Spoke to Pat at [facility] regarding Parisa's response. She is aware and understands. She will call the patient and advise her that she needs to see Dr Deric Ge to have an office visit note with documentation of what is needed to get this covered through insurance.

## 2022-10-04 NOTE — PROGRESS NOTES
Luis Murphy  (1935)    10/4/2022    Subjective:     Luis Murphy is 82 y.o. 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 No pertinent surgical history.   Family History   Problem Relation Age of Onset    High Cholesterol Mother     Arthritis Mother     High Cholesterol Father     Heart Failure Father     Arthritis Father     Heart Disease Father     High Cholesterol Sister     Heart Failure Sister     Heart Failure Maternal Grandmother     High Cholesterol Maternal Grandmother     Heart Failure Paternal Grandfather     High Cholesterol Paternal Grandfather      Social History     Socioeconomic History    Marital status:      Spouse name: Not on file    Number of children: Not on file    Years of education: Not on file    Highest education level: Not on file   Occupational History    Not on file   Tobacco Use    Smoking status: Never    Smokeless tobacco: Never   Substance and Sexual Activity    Alcohol use: No    Drug use: No    Sexual activity: Not Currently   Other Topics Concern    Not on file   Social History Narrative    Not on file     Social Determinants of Health     Financial Resource Strain: Low Risk     Difficulty of Paying Living Expenses: Not hard at all   Food Insecurity: No Food Insecurity    Worried About Running Out of Food in the Last Year: Never true    Ran Out of Food in the Last Year: Never true   Transportation Needs: Not on file   Physical Activity: Not on file   Stress: Not on file   Social Connections: Not on file   Intimate Partner Violence: Not on file   Housing Stability: Not on file       Current Outpatient Medications on File Prior to Visit   Medication Sig Dispense Refill    diclofenac sodium (VOLTAREN) 1 % GEL Apply 4 g topically 4 times daily 240 g 2    lidocaine (LMX) 4 % cream Apply a half dollar sized amount to intact skin topically up to twice daily as needed for pain 45 g 2    levothyroxine (SYNTHROID) 100 MCG tablet Take 1 tablet by mouth Daily 30 tablet 1    furosemide (LASIX) 20 MG tablet TAKE 1 TABLET BY MOUTH ONCE DAILY AS NEEDED FOR SWELLING 30 tablet 5    losartan (COZAAR) 50 mg tablet Take 1 tablet by mouth daily 90 tablet 3    tiotropium (SPIRIVA RESPIMAT) 2.5 MCG/ACT AERS inhaler Inhale 2 puffs into the lungs daily 3 each 3    rOPINIRole (REQUIP) 0.5 MG tablet Take one tablet po qhs 90 tablet 3    amitriptyline (ELAVIL) 10 MG tablet TAKE TWO TABLETS BY MOUTH ONCE DAILY AT NIGHT 180 tablet 3    Cholecalciferol (VITAMIN D) 50 MCG (2000 UT) CAPS capsule Take by mouth daily      folic acid (FOLVITE) 1 MG tablet Take 1 tablet by mouth daily 90 tablet 3    cyanocobalamin ([employer] VITAMIN B12) 1000 MCG tablet Take 1 tablet by mouth daily 90 tablet 3    naloxone 4 MG/0.1ML LIQD nasal spray 1 spray by Nasal route as needed for Opioid Reversal 1 each 0    albuterol sulfate  (90 Base) MCG/ACT inhaler Inhale 1 puff into the lungs every 6 hours as needed for Wheezing or Shortness of Breath 18 g 3    Sucralfate (CARAFATE PO) Carafate      esomeprazole (NEXIUM) 40 MG delayed release capsule Take 1 capsule po 30 minutes before your largest meal       No current facility-administered medications on file prior to visit. Pt presents today for a f/u of chronic knee pain.  She has been using more Benadryl for her allergies. Insurance would no longer pay for PT so she stopped and was given some home exercises to work on. Injections helped somewhat. Knees are ok if shes sitting. Pain is worse with movement. Says she has not been walking for about a year. Has an aide who comes 5 days/ week for an hour and patients daughter comes on weekends. Says she is able to transfer. Pt feels pain level and functioning improves with prescribed medications and is able to perform ADLs. Pt feels that walking aggravates the pain. Pt denies radiating numbness and tingling.  , he was terminal, that is why she wasn't able to keep her appointments. She declines MRI due to claustrophobia, doesn't want to take oral anxiolytics and doesn't want to be put to sleep for MRI either. Voltaren helped her. Didn't do knee injections. Got knee braces but says they dont fit her legs. Using norco and diclofenac gel. 22 BL knee inj      Review of Systems   Constitutional:  Negative for fever. HENT:  Negative for congestion and sore throat. Respiratory:  Negative for shortness of breath. Gastrointestinal:  Negative for abdominal pain. Genitourinary:  Negative for difficulty urinating. Musculoskeletal:  Positive for arthralgias. Neurological:  Negative for speech difficulty and headaches. Hematological:  Negative for adenopathy. Psychiatric/Behavioral:  Negative for agitation. Objective:     Vitals:  /78   Temp 97.1 °F (36.2 °C)   Ht 5' 5\" (1.651 m)   Wt 200 lb (90.7 kg)   BMI 33.28 kg/m² Pain Score:   8      Physical Exam  Vitals and nursing note reviewed. Pt is alert and oriented x 3. Recent and remote memory is intact. Mood, judgement and affect are normal.  No signs of distress or SOB noted. Visualized skin intact. Sensation intact to light touch. BL knee with decreased ROM. Tenderness noted with palpation. Mild swelling noted with arthritic deformity noted.   Crepitus with ROM. Gait antalgic. Able to stand unassisted, leans on table for balance. Shuffling steps. Assessment:      Diagnosis Orders   1. Bilateral primary osteoarthritis of knee  HYDROcodone-acetaminophen (NORCO) 5-325 MG per tablet      2. Chronic, continuous use of opioids  HYDROcodone-acetaminophen (NORCO) 5-325 MG per tablet      3. Encounter for monitoring opioid maintenance therapy  HYDROcodone-acetaminophen (NORCO) 5-325 MG per tablet      4. Weakness of both lower extremities  gabapentin (NEURONTIN) 100 MG capsule          Plan:     Periodic Controlled Substance Monitoring: Possible medication side effects, risk of tolerance/dependence & alternative treatments discussed., No signs of potential drug abuse or diversion identified. , Assessed functional status., Obtaining appropriate analgesic effect of treatment. (Kymberly Archer, APRN - CNP)    Orders Placed This Encounter   Medications    HYDROcodone-acetaminophen (NORCO) 5-325 MG per tablet     Sig: Take 1 tablet by mouth daily as needed for Pain for up to 30 days. Dispense:  30 tablet     Refill:  0     Reduce doses taken as pain becomes manageable    gabapentin (NEURONTIN) 100 MG capsule     Sig: Take 1 capsule by mouth daily as needed (leg or back pain) for up to 30 days. Dispense:  30 capsule     Refill:  0       No orders of the defined types were placed in this encounter. Discussed options with the patient today. Continue Norco.  Elavil from another provider. Patient has trouble being complaint for treatments due to transportation issues and difficulty leaving the house. Continue Norco.  Patient asked for an increase dose and also to restart gabapentin. Discussed with patient and granddaughter and will restart gabapentin at 100 mg every afternoon which she has tolerated in the past and will likely need to titrate up next month. We will hold on also increasing opioid at this time. All questions were answered.   Pt verbalized understanding and agrees with above plan. OARRS NOT AVAILABLE FOR THIS PATIENT. Otox reviewed and appropriate from 6/24/22. Narcan sent 12/10/21. Will continue medications for chronic pain as they help pt function with ADL and improve quality of life. Discussed possible risks of opiate medication with pt, including but not limited to, constipation, nausea or vomiting, sedation, urinary retention, dependence and possible addiction. Pt agrees to use medication as directed. Pt advised to not use opiates while driving or operating heavy equipment, or in situations where pt may harm him/herself or others. Pt is aware that while on narcotics, pt needs to be seen monthly to reassess pain and need for continued medication. NDP reviewed. OARRS was reviewed. This NP saw pt under direct supervision of Dr. Loulou Kohler. Follow up:  Return in about 4 weeks (around 11/1/2022) for review meds and reassess pain.     Aristeo Leiva, MARILYN - CNP

## 2022-10-14 ENCOUNTER — OFFICE VISIT (OUTPATIENT)
Dept: FAMILY MEDICINE CLINIC | Age: 87
End: 2022-10-14
Payer: COMMERCIAL

## 2022-10-14 VITALS
TEMPERATURE: 97.2 F | SYSTOLIC BLOOD PRESSURE: 156 MMHG | BODY MASS INDEX: 33.28 KG/M2 | OXYGEN SATURATION: 97 % | HEART RATE: 78 BPM | HEIGHT: 65 IN | DIASTOLIC BLOOD PRESSURE: 80 MMHG

## 2022-10-14 DIAGNOSIS — I10 ESSENTIAL HYPERTENSION: ICD-10-CM

## 2022-10-14 DIAGNOSIS — E03.9 HYPOTHYROIDISM, UNSPECIFIED TYPE: Primary | ICD-10-CM

## 2022-10-14 DIAGNOSIS — M16.9 OSTEOARTHRITIS OF HIP, UNSPECIFIED LATERALITY, UNSPECIFIED OSTEOARTHRITIS TYPE: ICD-10-CM

## 2022-10-14 DIAGNOSIS — M54.17 LUMBOSACRAL RADICULITIS: ICD-10-CM

## 2022-10-14 DIAGNOSIS — B35.1 ONYCHOMYCOSIS: ICD-10-CM

## 2022-10-14 PROCEDURE — 1123F ACP DISCUSS/DSCN MKR DOCD: CPT | Performed by: NURSE PRACTITIONER

## 2022-10-14 PROCEDURE — G8484 FLU IMMUNIZE NO ADMIN: HCPCS | Performed by: NURSE PRACTITIONER

## 2022-10-14 PROCEDURE — 99214 OFFICE O/P EST MOD 30 MIN: CPT | Performed by: NURSE PRACTITIONER

## 2022-10-14 PROCEDURE — G8400 PT W/DXA NO RESULTS DOC: HCPCS | Performed by: NURSE PRACTITIONER

## 2022-10-14 PROCEDURE — G8417 CALC BMI ABV UP PARAM F/U: HCPCS | Performed by: NURSE PRACTITIONER

## 2022-10-14 PROCEDURE — G8427 DOCREV CUR MEDS BY ELIG CLIN: HCPCS | Performed by: NURSE PRACTITIONER

## 2022-10-14 PROCEDURE — 1036F TOBACCO NON-USER: CPT | Performed by: NURSE PRACTITIONER

## 2022-10-14 PROCEDURE — 1090F PRES/ABSN URINE INCON ASSESS: CPT | Performed by: NURSE PRACTITIONER

## 2022-10-14 RX ORDER — LEVOTHYROXINE SODIUM 100 MCG
100 TABLET ORAL DAILY
Qty: 30 TABLET | Refills: 3 | Status: CANCELLED | OUTPATIENT
Start: 2022-10-14

## 2022-10-14 RX ORDER — LEVOTHYROXINE SODIUM 88 MCG
88 TABLET ORAL DAILY
Qty: 30 TABLET | Refills: 3 | Status: SHIPPED | OUTPATIENT
Start: 2022-10-14

## 2022-10-14 ASSESSMENT — ENCOUNTER SYMPTOMS
BLURRED VISION: 0
ORTHOPNEA: 0
SHORTNESS OF BREATH: 0

## 2022-10-14 NOTE — PROGRESS NOTES
Subjective:      Patient ID: Margo Coughlin is a 82 y.o. female who presents today for:     Chief Complaint   Patient presents with    Hypertension     Patient presents today to follow up on hypertension. Hypertension  This is a chronic problem. The current episode started more than 1 year ago. The problem is unchanged. The problem is controlled. Pertinent negatives include no anxiety, blurred vision, chest pain, headaches, malaise/fatigue, neck pain, orthopnea, palpitations, peripheral edema, PND, shortness of breath or sweats. Past treatments include angiotensin blockers. The current treatment provides significant improvement. follow-up today to refill of medication. She reports that she thinks Synthroid 100 mcg daily. She needs the name brand because the generic does not work well for her and she has reactions to it such as feeling sick to stomach. She does well in the name brand. Last thyroid level was off. She will use due for repeat. Reports polyarthralgia and has a hx of diffuse OA. Physical Exam:  Height and weight: WEIGHT , HEIGHTHeight: 5' 5\" (165.1 cm)  Cardiopulmonary exam: See Progress Note  Musculoskeletal exam: See Progress Note  Neurological Exam: (Gait, Balance, Coordination): See Progress Note      Symptoms that limit ambulation are Diffuse OA (hips, thoracic, lumbar, knees)     Diagnosis that are responsible for these symptoms:  See visit diagnosis list.    Medication or other treatment for these symptoms: See visit medication list .    Progression of ambulation difficulty over time has worsened    Other diagnoses that may relate to ambulatory problems include chronic knee and hip pain,     The patient can not walk 0 feet without stopping. Pace of ambulation is slow and unsteady even while using  Wheelchair    History of falls. Frequency: 0 cannot walk at all.  Can stand briefly   Circumstances leading to falls: weakness bilateral   Barbarann Labrador is isn't sufficient due to: severe pain in bilateral knees and hips    Ambulatory assistance currently used is  Wheelchair and it is not sufficient due to difficulty in getting around without power assistance    Patient's ability to independently perform eating, bathing, toileting, personal cares, hygiene, and dressing lower body  (MRADLs) has worsened and now requires use of power mobility device. Patient is unable to use a manual wheelchair due to weakness in upper body and OA in back and shoulders. Patient is not  able to stand up from a seated position without assistance. A power operated scooter would not be sufficient for this patient's need in the home due to patient can not safely transfer in and out of scooter, home environment does not provide adequate access for maneuvering scooter, lacks postural stability, U/E strength decreased ROM, decreased strength    The patient has a mobility limitation that significantly impairs his/her ability to participate in one or more mobility-related activities of daily living (MRADLs)  in the home eating, bathing, toileting, personal cares, ambulating, and grooming    The mobility deficit cannot be sufficiently and safely resolved by the use of an appropriately fitted cane or walker. Home setting is conducive to accommodate use of power wheelchair in the home and use of a power wheelchair will significantly improve the patient's ability to participate in eating, bathing, toileting, and personal cares in the home           The patient is able to safely transfer to and from a power wheelchair, operate the [employer] steering system and maintain postural stability and position while operating the power wheelchair in the home. The patient's mental capabilities and physical capabilities are sufficient for safe mobility using a power wheelchair in the home. The patient's weight is less than or equal to the weight capacity of the power wheelchair that is provided.     The patient has not expressed an unwillingness to use a power wheelchair in the home. Past Medical History:   Diagnosis Date    Arthritis     Arthritis of both knees     Chronic back pain     COPD (chronic obstructive pulmonary disease) ([facility])     Depression     Deviated septum     per patient     Emphysema (subcutaneous) (surgical) resulting from a procedure     Fibromyalgia     Irritable bowel syndrome     Lyme arthritis ([facility])     Neuropathy     Pregnancy, GI problems     Pulmonary fibrosis ([address]     per patient     Restless leg syndrome     Spinal stenosis     Urinary incontinence      No past surgical history on file.   Family History   Problem Relation Age of Onset    High Cholesterol Mother     Arthritis Mother     High Cholesterol Father     Heart Failure Father     Arthritis Father     Heart Disease Father     High Cholesterol Sister     Heart Failure Sister     Heart Failure Maternal Grandmother     High Cholesterol Maternal Grandmother     Heart Failure Paternal Grandfather     High Cholesterol Paternal Grandfather      Social History     Socioeconomic History    Marital status:      Spouse name: Not on file    Number of children: Not on file    Years of education: Not on file    Highest education level: Not on file   Occupational History    Not on file   Tobacco Use    Smoking status: Never    Smokeless tobacco: Never   Substance and Sexual Activity    Alcohol use: No    Drug use: No    Sexual activity: Not Currently   Other Topics Concern    Not on file   Social History Narrative    Not on file     Social Determinants of Health     Financial Resource Strain: Low Risk     Difficulty of Paying Living Expenses: Not hard at all   Food Insecurity: No Food Insecurity    Worried About Running Out of Food in the Last Year: Never true    Ran Out of Food in the Last Year: Never true   Transportation Needs: Not on file   Physical Activity: Not on file   Stress: Not on file   Social Connections: Not on file   Intimate Partner Violence: Not on file   Housing Stability: Not on file     Current Outpatient Medications on File Prior to Visit   Medication Sig Dispense Refill    HYDROcodone-acetaminophen (NORCO) 5-325 MG per tablet Take 1 tablet by mouth daily as needed for Pain for up to 30 days. 30 tablet 0    gabapentin (NEURONTIN) 100 MG capsule Take 1 capsule by mouth daily as needed (leg or back pain) for up to 30 days.  30 capsule 0    lidocaine (LMX) 4 % cream Apply a half dollar sized amount to intact skin topically up to twice daily as needed for pain 45 g 2    furosemide (LASIX) 20 MG tablet TAKE 1 TABLET BY MOUTH ONCE DAILY AS NEEDED FOR SWELLING 30 tablet 5    losartan (COZAAR) 50 mg tablet Take 1 tablet by mouth daily 90 tablet 3    rOPINIRole (REQUIP) 0.5 MG tablet Take one tablet po qhs 90 tablet 3    amitriptyline (ELAVIL) 10 MG tablet TAKE TWO TABLETS BY MOUTH ONCE DAILY AT NIGHT 180 tablet 3    Cholecalciferol (VITAMIN D) 50 MCG (2000 UT) CAPS capsule Take by mouth daily      folic acid (FOLVITE) 1 MG tablet Take 1 tablet by mouth daily 90 tablet 3    cyanocobalamin ([employer] VITAMIN B12) 1000 MCG tablet Take 1 tablet by mouth daily 90 tablet 3    Sucralfate (CARAFATE PO) Carafate      esomeprazole (NEXIUM) 40 MG delayed release capsule Take 1 capsule po 30 minutes before your largest meal      diclofenac sodium (VOLTAREN) 1 % GEL Apply 4 g topically 4 times daily 240 g 2    tiotropium (SPIRIVA RESPIMAT) 2.5 MCG/ACT AERS inhaler Inhale 2 puffs into the lungs daily (Patient not taking: Reported on 10/14/2022) 3 each 3    naloxone 4 MG/0.1ML LIQD nasal spray 1 spray by Nasal route as needed for Opioid Reversal (Patient not taking: Reported on 10/14/2022) 1 each 0    albuterol sulfate  (90 Base) MCG/ACT inhaler Inhale 1 puff into the lungs every 6 hours as needed for Wheezing or Shortness of Breath (Patient not taking: Reported on 10/14/2022) 18 g 3     No current facility-administered medications on file prior to visit. Allergies:  Azithromycin and Ceftin  [cefuroxime axetil]    Review of Systems   Constitutional:  Negative for malaise/fatigue. Eyes:  Negative for blurred vision. Respiratory:  Negative for shortness of breath. Cardiovascular:  Negative for chest pain, palpitations, orthopnea and PND. Musculoskeletal:  Negative for neck pain. Neurological:  Negative for headaches. Objective:   BP (!) 156/80 (Site: Right Upper Arm, Position: Sitting, Cuff Size: Medium Adult)   Pulse 78   Temp 97.2 °F (36.2 °C) (Temporal)   Ht 5' 5\" (1.651 m)   SpO2 97%   BMI 33.28 kg/m²     Physical Exam  Constitutional:       Appearance: She is well-developed. HENT:      Head: Normocephalic. Right Ear: Tympanic membrane, ear canal and external ear normal.      Left Ear: Tympanic membrane, ear canal and external ear normal.      Nose: Nose normal.      Mouth/Throat:      Mouth: Mucous membranes are moist.      Pharynx: Oropharynx is clear. Uvula midline. Eyes:      General:         Right eye: No discharge. Left eye: No discharge. Conjunctiva/sclera: Conjunctivae normal.   Cardiovascular:      Rate and Rhythm: Normal rate and regular rhythm. Heart sounds: Normal heart sounds. Pulmonary:      Effort: Pulmonary effort is normal. No respiratory distress. Breath sounds: Normal breath sounds. Musculoskeletal:      Cervical back: Normal range of motion. Feet:      Right foot:      Protective Sensation: 5 sites tested. 5 sites sensed. Toenail Condition: Fungal disease present. Left foot:      Protective Sensation: 5 sites tested. 5 sites sensed. Lymphadenopathy:      Cervical: No cervical adenopathy. Skin:     General: Skin is warm and dry. Neurological:      Mental Status: She is alert and oriented to person, place, and time. Psychiatric:         Mood and Affect: Mood normal.         Behavior: Behavior normal.       Assessment:          Diagnosis Orders   1. Hypothyroidism, unspecified type  SYNTHROID 88 MCG tablet    TSH with Reflex      2. Essential hypertension  Basic Metabolic Panel      3. Osteoarthritis of hip, unspecified laterality, unspecified osteoarthritis type        4. Lumbosacral radiculitis        5. Onychomycosis  ciclopirox (PENLAC) 8 % solution          Plan:      Orders Placed This Encounter   Procedures    TSH with Reflex     Standing Status:   Future     Standing Expiration Date:   23/43/3748    Basic Metabolic Panel     Standing Status:   Future     Standing Expiration Date:   10/14/2023          Orders Placed This Encounter   Medications    SYNTHROID 88 MCG tablet     Sig: Take 1 tablet by mouth Daily     Dispense:  30 tablet     Refill:  3    ciclopirox (PENLAC) 8 % solution     Sig: Apply topically nightly. Dispense:  6 mL     Refill:  1       Return in about 6 months (around 4/14/2023). 1. Hypothyroidism, unspecified type    - SYNTHROID 88 MCG tablet; Take 1 tablet by mouth Daily  Dispense: 30 tablet; Refill: 3  - TSH with Reflex; Future    2. Essential hypertension  Continue losartan 50mg daily. DASH diet. - Basic Metabolic Panel; Future    3. Osteoarthritis of hip, unspecified laterality, unspecified osteoarthritis type  Pt can barely stand to pivot. She cannot get around without a wheelchair and does not have the strength to wheel the wheelchair herself. She would benefit from a power chair. 4. Lumbosacral radiculitis      5. Onychomycosis    - ciclopirox (PENLAC) 8 % solution; Apply topically nightly. Dispense: 6 mL; Refill: 1    Reviewed with the patient: current clinicalstatus, medications, activities and diet. Side effects, adverse effects of the medication prescribedtoday, as well as treatment plan/ rationale and result expectations have been discussedwith the patient who expresses understanding and desires to proceed. Close follow upto evaluate treatment results and for coordination of care.   I have reviewedthe patient's medical history in detail and updated the computerized patient record.     Sukhi Razo, MARILYN - CNP

## 2022-11-15 ENCOUNTER — OFFICE VISIT (OUTPATIENT)
Dept: NEUROSURGERY | Age: 87
End: 2022-11-15
Payer: COMMERCIAL

## 2022-11-15 VITALS
DIASTOLIC BLOOD PRESSURE: 72 MMHG | SYSTOLIC BLOOD PRESSURE: 124 MMHG | BODY MASS INDEX: 39.27 KG/M2 | HEIGHT: 60 IN | WEIGHT: 200 LBS | TEMPERATURE: 97.1 F

## 2022-11-15 DIAGNOSIS — M17.0 BILATERAL PRIMARY OSTEOARTHRITIS OF KNEE: ICD-10-CM

## 2022-11-15 DIAGNOSIS — Z51.81 ENCOUNTER FOR MONITORING OPIOID MAINTENANCE THERAPY: ICD-10-CM

## 2022-11-15 DIAGNOSIS — R29.898 WEAKNESS OF BOTH LOWER EXTREMITIES: ICD-10-CM

## 2022-11-15 DIAGNOSIS — Z79.891 ENCOUNTER FOR MONITORING OPIOID MAINTENANCE THERAPY: ICD-10-CM

## 2022-11-15 DIAGNOSIS — F11.90 CHRONIC, CONTINUOUS USE OF OPIOIDS: ICD-10-CM

## 2022-11-15 PROCEDURE — G8427 DOCREV CUR MEDS BY ELIG CLIN: HCPCS | Performed by: NURSE PRACTITIONER

## 2022-11-15 PROCEDURE — 99213 OFFICE O/P EST LOW 20 MIN: CPT | Performed by: NURSE PRACTITIONER

## 2022-11-15 PROCEDURE — G8400 PT W/DXA NO RESULTS DOC: HCPCS | Performed by: NURSE PRACTITIONER

## 2022-11-15 PROCEDURE — G8417 CALC BMI ABV UP PARAM F/U: HCPCS | Performed by: NURSE PRACTITIONER

## 2022-11-15 PROCEDURE — 1123F ACP DISCUSS/DSCN MKR DOCD: CPT | Performed by: NURSE PRACTITIONER

## 2022-11-15 PROCEDURE — 1036F TOBACCO NON-USER: CPT | Performed by: NURSE PRACTITIONER

## 2022-11-15 PROCEDURE — 3078F DIAST BP <80 MM HG: CPT | Performed by: NURSE PRACTITIONER

## 2022-11-15 PROCEDURE — G8484 FLU IMMUNIZE NO ADMIN: HCPCS | Performed by: NURSE PRACTITIONER

## 2022-11-15 PROCEDURE — 3074F SYST BP LT 130 MM HG: CPT | Performed by: NURSE PRACTITIONER

## 2022-11-15 PROCEDURE — 1090F PRES/ABSN URINE INCON ASSESS: CPT | Performed by: NURSE PRACTITIONER

## 2022-11-15 RX ORDER — GABAPENTIN 100 MG/1
100 CAPSULE ORAL DAILY PRN
Qty: 30 CAPSULE | Refills: 0 | Status: SHIPPED | OUTPATIENT
Start: 2022-11-15 | End: 2022-12-15

## 2022-11-15 RX ORDER — HYDROCODONE BITARTRATE AND ACETAMINOPHEN 5; 325 MG/1; MG/1
1 TABLET ORAL DAILY PRN
Qty: 30 TABLET | Refills: 0 | Status: SHIPPED | OUTPATIENT
Start: 2022-11-15 | End: 2022-12-15

## 2022-11-15 ASSESSMENT — ENCOUNTER SYMPTOMS
BACK PAIN: 1
RESPIRATORY NEGATIVE: 1
CONSTIPATION: 0
DIARRHEA: 0

## 2022-11-15 NOTE — PROGRESS NOTES
Patient: Josefa Monet  YOB: 1935  Date: 11/15/22        Subjective:     Josefa Monet is a 82 y.o. 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 No pertinent surgical history.   Family History   Problem Relation Age of Onset    High Cholesterol Mother     Arthritis Mother     High Cholesterol Father     Heart Failure Father     Arthritis Father     Heart Disease Father     High Cholesterol Sister     Heart Failure Sister     Heart Failure Maternal Grandmother     High Cholesterol Maternal Grandmother     Heart Failure Paternal Grandfather     High Cholesterol Paternal Grandfather      Social History     Socioeconomic History    Marital status:      Spouse name: Not on file    Number of children: Not on file    Years of education: Not on file    Highest education level: Not on file   Occupational History    Not on file   Tobacco Use    Smoking status: Never    Smokeless tobacco: Never   Substance and Sexual Activity    Alcohol use: No    Drug use: No    Sexual activity: Not Currently   Other Topics Concern    Not on file   Social History Narrative    Not on file     Social Determinants of Health     Financial Resource Strain: Not on file   Food Insecurity: Not on file   Transportation Needs: Not on file   Physical Activity: Not on file   Stress: Not on file   Social Connections: Not on file   Intimate Partner Violence: Not on file   Housing Stability: Not on file       Current Outpatient Medications on File Prior to Visit   Medication Sig Dispense Refill    diphenhydrAMINE HCl (BENADRYL ALLERGY PO) Take by mouth      SYNTHROID 88 MCG tablet Take 1 tablet by mouth Daily 30 tablet 3    ciclopirox (PENLAC) 8 % solution Apply topically nightly. 6 mL 1    lidocaine (LMX) 4 % cream Apply a half dollar sized amount to intact skin topically up to twice daily as needed for pain 45 g 2    furosemide (LASIX) 20 MG tablet TAKE 1 TABLET BY MOUTH ONCE DAILY AS NEEDED FOR SWELLING 30 tablet 5    losartan (COZAAR) 50 mg tablet Take 1 tablet by mouth daily 90 tablet 3    tiotropium (SPIRIVA RESPIMAT) 2.5 MCG/ACT AERS inhaler Inhale 2 puffs into the lungs daily 3 each 3    rOPINIRole (REQUIP) 0.5 MG tablet Take one tablet po qhs 90 tablet 3    amitriptyline (ELAVIL) 10 MG tablet TAKE TWO TABLETS BY MOUTH ONCE DAILY AT NIGHT 180 tablet 3    Cholecalciferol (VITAMIN D) 50 MCG (2000 UT) CAPS capsule Take by mouth daily      folic acid (FOLVITE) 1 MG tablet Take 1 tablet by mouth daily 90 tablet 3    cyanocobalamin ([employer] VITAMIN B12) 1000 MCG tablet Take 1 tablet by mouth daily 90 tablet 3    naloxone 4 MG/0.1ML LIQD nasal spray 1 spray by Nasal route as needed for Opioid Reversal 1 each 0    esomeprazole (NEXIUM) 40 MG delayed release capsule Take 1 capsule po 30 minutes before your largest meal      diclofenac sodium (VOLTAREN) 1 % GEL Apply 4 g topically 4 times daily 240 g 2    Sucralfate (CARAFATE PO) Carafate (Patient not taking: Reported on 11/15/2022)       No current facility-administered medications on file prior to visit. 83 yo female  for a f/u of chronic knee , low back and feet. Her granddaugher Emily Melendez is here. Insurance would no longer pay for PT so she stopped and was given some home exercises to work on. Injections helped somewhat.  Knees are ok if shes sitting. Pain is worse with movement. Says she has not been walking for about a year. Has an aide who comes 5 days/ week for an hour and patients daughter comes on weekends. Says she is able to transfer. Pt feels pain level and functioning improves with prescribed medications and is able to perform ADLs. Pt feels that walking aggravates the pain. Pt denies radiating numbness and tingling.  , he was terminal, that is why she wasn't able to keep her appointments. She declines MRI due to claustrophobia, doesn't want to take oral anxiolytics and doesn't want to be put to sleep for MRI either. Voltaren helped her. Didn't do knee injections. Got knee braces but says they dont fit her legs. Using norco and diclofenac gel. 22 BL knee inj       Knee Pain   Pertinent negatives include no numbness. Review of Systems   Constitutional: Negative. Negative for activity change and unexpected weight change. HENT: Negative. Negative for hearing loss. Respiratory: Negative. Cardiovascular:  Negative for leg swelling. Gastrointestinal:  Negative for constipation and diarrhea. Genitourinary: Negative. Musculoskeletal:  Positive for arthralgias and back pain. Negative for gait problem, joint swelling and neck pain. Skin:  Negative for rash. Neurological:  Negative for dizziness, weakness, numbness and headaches. Psychiatric/Behavioral: Negative. Negative for sleep disturbance. Objective:     Vitals:  /72 (Site: Right Upper Arm)   Temp 97.1 °F (36.2 °C) (Temporal)   Ht 5' (1.524 m)   Wt 200 lb (90.7 kg)   BMI 39.06 kg/m² Pain Score:   8    Physical Exam  Vitals reviewed. Constitutional:       Appearance: She is well-developed. HENT:      Head: Normocephalic. Nose: Nose normal.   Pulmonary:      Effort: Pulmonary effort is normal.   Musculoskeletal:      Cervical back: Normal range of motion and neck supple. Lumbar back: No tenderness. Decreased range of motion. Negative right straight leg raise test and negative left straight leg raise test.      Right knee: Decreased range of motion. Left knee: Decreased range of motion. Lymphadenopathy:      Cervical: No cervical adenopathy. Skin:     General: Skin is warm and dry. Findings: No erythema or rash. Neurological:      Mental Status: She is alert and oriented to person, place, and time. Cranial Nerves: No cranial nerve deficit. Deep Tendon Reflexes: Reflexes are normal and symmetric. Reflexes normal.   Psychiatric:         Mood and Affect: Mood normal.         Behavior: Behavior normal.         Thought Content: Thought content normal.         Judgment: Judgment normal.      Outside record review:  Dr. Carolann Mendez 11/10/2021: New to provider. MRI not completed refusing physical therapy due to pain. XR bilateral hips 11/10/2021: Lumbar spine degenerative changes, SI joints intact, no fractures. Mild bilateral degenerative changes of the hips. XR L-spine 11/10/2021: 5 lumbar vertebrae. No fractures. Degenerative disc disease and facet arthropathy. XR L knee 11/10/2021: Osteopenia, medial joint space narrowing, no fractures, moderate tricompartmental degenerative changes  XR ?R (labeled as Left) knee 11/10/2021: osteopenia, medial joint space compartment narrowing, chondrocalcinosis, up to moderate tricompartmental degenerative changes. (Likely mislabeled as there are duplicate left knee reports). Assessment:        Diagnosis Orders   1. Bilateral primary osteoarthritis of knee  HYDROcodone-acetaminophen (NORCO) 5-325 MG per tablet      2. Chronic, continuous use of opioids  HYDROcodone-acetaminophen (NORCO) 5-325 MG per tablet      3. Encounter for monitoring opioid maintenance therapy  HYDROcodone-acetaminophen (NORCO) 5-325 MG per tablet      4.  Weakness of both lower extremities  gabapentin (NEURONTIN) 100 MG capsule          Plan:     Periodic Controlled Substance Monitoring: Possible medication side effects, risk of tolerance/dependence & alternative treatments discussed., No signs of potential drug abuse or diversion identified. , Assessed functional status. Michael Ibrahim, APRN - CNP)    Orders Placed This Encounter   Medications    HYDROcodone-acetaminophen (NORCO) 5-325 MG per tablet     Sig: Take 1 tablet by mouth daily as needed for Pain for up to 30 days. Dispense:  30 tablet     Refill:  0     Reduce doses taken as pain becomes manageable    gabapentin (NEURONTIN) 100 MG capsule     Sig: Take 1 capsule by mouth daily as needed (leg or back pain) for up to 30 days. Dispense:  30 capsule     Refill:  0       No orders of the defined types were placed in this encounter.    -she may stop the norco and get the gabapentin from PCP. She doesn't want to come here monthly any more especially in the winter. She already discussed this with her. Discussed options with the patient today. Anatomic model pathology was shown and reviewed with pt. All questions were answered. Relevant imaging reviewed, NDP reviewed and pain generators reviewed. Pt verbalized understanding and agrees with above plan. Will continue medications as they do help pt function with ADL and improve quality of life. Pt understands potential side effects from opioids such as constipation, dry mouth, dizziness, opioid use disorder, and overdose. Pt has failed non opioid therapy and decision was made to prescribe opioids to help with daily function and improve quality of life. Discussed the principles of opioid tolerance as well as the concerns regarding opioid diversion, misuse, and abuse. Patient is responsible to safely store and appropriately dispose of the medication. Discussed the risks of respiratory depression and death while on pain medications, especially when combined with other sedative substances. Discussed the elevated risks of excessive sedation while on pain medications. Advised him against driving or operating heavy machinery or performing any activities where he may harm himself or others while on pain medications. Particular caution was emphasized especially during dose adjustments and medication changes. OARRS was reviewed. UTOX from 6/2022 appropriate; ORT score = 0  Daily MME 5  Narcan prescribed 12/2021 and understands the proper use in the event of an overdose. Controlled Substances Monitoring: Periodic Controlled Substance Monitoring: Possible medication side effects, risk of tolerance/dependence & alternative treatments discussed., No signs of potential drug abuse or diversion identified. , Assessed functional status. Venkat Carvalho, APRN - CNP)       Follow up:  Return in about 4 weeks (around 12/13/2022) for review meds and reassess pain.     Alva Byrne, MARILYN - CNP

## 2022-11-22 DIAGNOSIS — E03.9 HYPOTHYROIDISM, UNSPECIFIED TYPE: ICD-10-CM

## 2022-11-22 DIAGNOSIS — I10 ESSENTIAL HYPERTENSION: ICD-10-CM

## 2022-11-22 LAB
ANION GAP SERPL CALCULATED.3IONS-SCNC: 12 MEQ/L (ref 9–15)
BUN BLDV-MCNC: 12 MG/DL (ref 8–23)
CALCIUM SERPL-MCNC: 9.4 MG/DL (ref 8.5–9.9)
CHLORIDE BLD-SCNC: 104 MEQ/L (ref 95–107)
CO2: 24 MEQ/L (ref 20–31)
CREAT SERPL-MCNC: 0.67 MG/DL (ref 0.5–0.9)
GFR SERPL CREATININE-BSD FRML MDRD: >60 ML/MIN/{1.73_M2}
GLUCOSE BLD-MCNC: 114 MG/DL (ref 70–99)
POTASSIUM SERPL-SCNC: 4.7 MEQ/L (ref 3.4–4.9)
SODIUM BLD-SCNC: 140 MEQ/L (ref 135–144)
TSH REFLEX: 0.33 UIU/ML (ref 0.44–3.86)

## 2022-11-29 ENCOUNTER — TELEPHONE (OUTPATIENT)
Dept: PAIN MANAGEMENT | Age: 87
End: 2022-11-29

## 2022-12-16 ENCOUNTER — NURSE TRIAGE (OUTPATIENT)
Dept: OTHER | Facility: CLINIC | Age: 87
End: 2022-12-16

## 2022-12-16 NOTE — TELEPHONE ENCOUNTER
Location of patient: [place]    Received call from [place] at [facility] with Red Flag Complaint. Subjective: Caller states \"URI\"     Current Symptoms:   Minimal productive cough of yellow sputum  Runny nose- clear drainage  Fatigue    Onset: 3-4 weeks ago; worsening    Pain Severity: Denies    Temperature: Denies    What has been tried: Mucinex    Recommended disposition: See PCP within 3 Days    Care advice provided, patient verbalizes understanding; denies any other questions or concerns; instructed to call back for any new or worsening symptoms. Patient/Caller agrees with recommended disposition; writer provided warm transfer to Dawson Choe at [facility] for appointment scheduling    Attention Provider: Thank you for allowing me to participate in the care of your patient. The patient was connected to triage in response to information provided to the ECC/PSC. Please do not respond through this encounter as the response is not directed to a shared pool.     Reason for Disposition   Cough has been present for > 3 weeks    Protocols used: Cough-ADULT-[place]

## 2022-12-29 DIAGNOSIS — G25.81 RESTLESS LEG SYNDROME: ICD-10-CM

## 2022-12-29 RX ORDER — ROPINIROLE 0.5 MG/1
TABLET, FILM COATED ORAL
Qty: 30 TABLET | Refills: 0 | Status: SHIPPED | OUTPATIENT
Start: 2022-12-29

## 2022-12-29 NOTE — TELEPHONE ENCOUNTER
Patient requesting medication refill.  Please approve or deny this request.    Rx requested:  Requested Prescriptions     Pending Prescriptions Disp Refills    rOPINIRole (REQUIP) 0.5 MG tablet 90 tablet 3     Sig: Take one tablet po qhs         Last Office Visit:   10/14/2022      Next Visit Date:  Future Appointments   Date Time Provider Jaylin Brownlee   4/14/2023  3:30 PM Ashley Harman, [address]

## 2023-01-10 ENCOUNTER — TELEPHONE (OUTPATIENT)
Dept: PAIN MANAGEMENT | Age: 88
End: 2023-01-10

## 2023-01-17 ENCOUNTER — TELEPHONE (OUTPATIENT)
Dept: PAIN MANAGEMENT | Age: 88
End: 2023-01-17

## 2023-01-17 ENCOUNTER — OFFICE VISIT (OUTPATIENT)
Dept: NEUROSURGERY | Age: 88
End: 2023-01-17
Payer: COMMERCIAL

## 2023-01-17 VITALS
SYSTOLIC BLOOD PRESSURE: 126 MMHG | DIASTOLIC BLOOD PRESSURE: 74 MMHG | HEIGHT: 64 IN | TEMPERATURE: 97.1 F | WEIGHT: 198 LBS | BODY MASS INDEX: 33.8 KG/M2

## 2023-01-17 DIAGNOSIS — R29.898 WEAKNESS OF BOTH LOWER EXTREMITIES: ICD-10-CM

## 2023-01-17 DIAGNOSIS — F11.90 CHRONIC, CONTINUOUS USE OF OPIOIDS: ICD-10-CM

## 2023-01-17 DIAGNOSIS — M17.0 BILATERAL PRIMARY OSTEOARTHRITIS OF KNEE: Primary | ICD-10-CM

## 2023-01-17 DIAGNOSIS — Z79.891 ENCOUNTER FOR MONITORING OPIOID MAINTENANCE THERAPY: ICD-10-CM

## 2023-01-17 DIAGNOSIS — Z51.81 ENCOUNTER FOR MONITORING OPIOID MAINTENANCE THERAPY: ICD-10-CM

## 2023-01-17 DIAGNOSIS — M79.671 CHRONIC PAIN OF BOTH FEET: ICD-10-CM

## 2023-01-17 DIAGNOSIS — M79.672 CHRONIC PAIN OF BOTH FEET: ICD-10-CM

## 2023-01-17 DIAGNOSIS — G89.29 CHRONIC PAIN OF BOTH FEET: ICD-10-CM

## 2023-01-17 PROCEDURE — 3074F SYST BP LT 130 MM HG: CPT | Performed by: NURSE PRACTITIONER

## 2023-01-17 PROCEDURE — 99214 OFFICE O/P EST MOD 30 MIN: CPT | Performed by: NURSE PRACTITIONER

## 2023-01-17 PROCEDURE — G8417 CALC BMI ABV UP PARAM F/U: HCPCS | Performed by: NURSE PRACTITIONER

## 2023-01-17 PROCEDURE — G8427 DOCREV CUR MEDS BY ELIG CLIN: HCPCS | Performed by: NURSE PRACTITIONER

## 2023-01-17 PROCEDURE — 1036F TOBACCO NON-USER: CPT | Performed by: NURSE PRACTITIONER

## 2023-01-17 PROCEDURE — 1090F PRES/ABSN URINE INCON ASSESS: CPT | Performed by: NURSE PRACTITIONER

## 2023-01-17 PROCEDURE — 1123F ACP DISCUSS/DSCN MKR DOCD: CPT | Performed by: NURSE PRACTITIONER

## 2023-01-17 PROCEDURE — 3078F DIAST BP <80 MM HG: CPT | Performed by: NURSE PRACTITIONER

## 2023-01-17 PROCEDURE — G8484 FLU IMMUNIZE NO ADMIN: HCPCS | Performed by: NURSE PRACTITIONER

## 2023-01-17 PROCEDURE — G8400 PT W/DXA NO RESULTS DOC: HCPCS | Performed by: NURSE PRACTITIONER

## 2023-01-17 RX ORDER — GABAPENTIN 100 MG/1
100 CAPSULE ORAL DAILY PRN
Qty: 30 CAPSULE | Refills: 1 | Status: SHIPPED | OUTPATIENT
Start: 2023-01-17 | End: 2023-02-16

## 2023-01-17 RX ORDER — HYDROCODONE BITARTRATE AND ACETAMINOPHEN 5; 325 MG/1; MG/1
1 TABLET ORAL DAILY PRN
Qty: 30 TABLET | Refills: 0 | Status: SHIPPED | OUTPATIENT
Start: 2023-01-17 | End: 2023-02-16

## 2023-01-17 ASSESSMENT — ENCOUNTER SYMPTOMS
DIARRHEA: 0
RESPIRATORY NEGATIVE: 1
CONSTIPATION: 0
BACK PAIN: 1

## 2023-01-17 NOTE — PROGRESS NOTES
Patient: Donny Williamson  YOB: 1935  Date: 1/17/23        Subjective:     Donny Williamson is a 82 y.o. 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 No pertinent surgical history.   Family History   Problem Relation Age of Onset    High Cholesterol Mother     Arthritis Mother     High Cholesterol Father     Heart Failure Father     Arthritis Father     Heart Disease Father     High Cholesterol Sister     Heart Failure Sister     Heart Failure Maternal Grandmother     High Cholesterol Maternal Grandmother     Heart Failure Paternal Grandfather     High Cholesterol Paternal Grandfather      Social History     Socioeconomic History    Marital status:      Spouse name: Not on file    Number of children: Not on file    Years of education: Not on file    Highest education level: Not on file   Occupational History    Not on file   Tobacco Use    Smoking status: Never    Smokeless tobacco: Never   Substance and Sexual Activity    Alcohol use: No    Drug use: No    Sexual activity: Not Currently   Other Topics Concern    Not on file   Social History Narrative    Not on file     Social Determinants of Health     Financial Resource Strain: Not on file   Food Insecurity: Not on file   Transportation Needs: Not on file   Physical Activity: Not on file   Stress: Not on file   Social Connections: Not on file   Intimate Partner Violence: Not on file   Housing Stability: Not on file       Current Outpatient Medications on File Prior to Visit   Medication Sig Dispense Refill    ALBUTEROL IN Inhale into the lungs      rOPINIRole (REQUIP) 0.5 MG tablet Take one tablet po qhs 30 tablet 0    diphenhydrAMINE HCl (BENADRYL ALLERGY PO) Take by mouth      SYNTHROID 88 MCG tablet Take 1 tablet by mouth Daily 30 tablet 3    ciclopirox (PENLAC) 8 % solution Apply topically nightly. 6 mL 1    lidocaine (LMX) 4 % cream Apply a half dollar sized amount to intact skin topically up to twice daily as needed for pain 45 g 2    furosemide (LASIX) 20 MG tablet TAKE 1 TABLET BY MOUTH ONCE DAILY AS NEEDED FOR SWELLING 30 tablet 5    losartan (COZAAR) 50 mg tablet Take 1 tablet by mouth daily 90 tablet 3    tiotropium (SPIRIVA RESPIMAT) 2.5 MCG/ACT AERS inhaler Inhale 2 puffs into the lungs daily 3 each 3    amitriptyline (ELAVIL) 10 MG tablet TAKE TWO TABLETS BY MOUTH ONCE DAILY AT NIGHT 180 tablet 3    Cholecalciferol (VITAMIN D) 50 MCG (2000 UT) CAPS capsule Take by mouth daily      folic acid (FOLVITE) 1 MG tablet Take 1 tablet by mouth daily 90 tablet 3    cyanocobalamin ([employer] VITAMIN B12) 1000 MCG tablet Take 1 tablet by mouth daily 90 tablet 3    naloxone 4 MG/0.1ML LIQD nasal spray 1 spray by Nasal route as needed for Opioid Reversal 1 each 0    esomeprazole (NEXIUM) 40 MG delayed release capsule Take 1 capsule po 30 minutes before your largest meal      diclofenac sodium (VOLTAREN) 1 % GEL Apply 4 g topically 4 times daily 240 g 2     No current facility-administered medications on file prior to visit. 81 yo female  for a f/u of chronic knee , low back and feet. Upper back, low back . Her granddaugher Jaret Rosario is here, also her aide. Had covid pneumonia 1/5/2023. She is looking for another PCP. She would also like to see a podiatrist for her toenails and foot pain. She would like knee injections. Insurance would no longer pay for PT so she stopped and was given some home exercises to work on. Injections helped somewhat. Knees are ok if shes sitting. Pain is worse with movement. Says she has not been walking for about a year. Has an aide who comes 5 days/ week for an hour and patients daughter comes on weekends. Says she is able to transfer. Pt feels pain level and functioning improves with prescribed medications and is able to perform ADLs. Pt feels that walking aggravates the pain. Pt denies radiating numbness and tingling.  , he was terminal, that is why she wasn't able to keep her appointments. She declines MRI due to claustrophobia, doesn't want to take oral anxiolytics and doesn't want to be put to sleep for MRI either. Voltaren helped her. Didn't do knee injections. Got knee braces but says they dont fit her legs. Using norco and diclofenac gel. 22 BL knee inj    Knee Pain   Pertinent negatives include no numbness. Review of Systems   Constitutional: Negative. Negative for activity change and unexpected weight change. HENT: Negative. Negative for hearing loss. Respiratory: Negative. Cardiovascular:  Negative for leg swelling. Gastrointestinal:  Negative for constipation and diarrhea. Genitourinary: Negative. Musculoskeletal:  Positive for arthralgias and back pain. Negative for gait problem, joint swelling and neck pain. Skin:  Negative for rash. Neurological:  Negative for dizziness, weakness, numbness and headaches. Psychiatric/Behavioral: Negative. Negative for sleep disturbance. Objective:     Vitals:  /74 (Site: Left Upper Arm)   Temp 97.1 °F (36.2 °C) (Temporal)   Ht 5' 4\" (1.626 m)   Wt 198 lb (89.8 kg)   BMI 33.99 kg/m² Pain Score:   8    Physical Exam  Vitals reviewed. Constitutional:       Appearance: She is well-developed. HENT:      Head: Normocephalic.       Nose: Nose normal.   Pulmonary:      Effort: Pulmonary effort is normal.   Musculoskeletal:      Cervical back: Neck supple. Decreased range of motion. Lumbar back: No tenderness. Decreased range of motion. Negative right straight leg raise test and negative left straight leg raise test.      Comments: In wheelchair   Lymphadenopathy:      Cervical: No cervical adenopathy. Skin:     General: Skin is warm and dry. Findings: No erythema or rash. Neurological:      Mental Status: She is alert and oriented to person, place, and time. Cranial Nerves: No cranial nerve deficit. Deep Tendon Reflexes: Reflexes are normal and symmetric. Reflexes normal.   Psychiatric:         Mood and Affect: Mood normal.         Behavior: Behavior normal.         Thought Content: Thought content normal.         Judgment: Judgment normal.          Assessment:        Diagnosis Orders   1. Bilateral primary osteoarthritis of knee  [place] ARTHROCENTESIS ASPIR&/INJ MAJOR JT/BURSA W/O US    CHG FLUOROSCOPIC GUIDANCE NEEDLE PLACEMENT ADD ON    HYDROcodone-acetaminophen (NORCO) 5-325 MG per tablet      2. Chronic pain of both feet  Izabel Perales, ELIJAH, Podiatry, [place]      3. Chronic, continuous use of opioids  HYDROcodone-acetaminophen (NORCO) 5-325 MG per tablet      4. Encounter for monitoring opioid maintenance therapy  HYDROcodone-acetaminophen (NORCO) 5-325 MG per tablet      5. Weakness of both lower extremities  gabapentin (NEURONTIN) 100 MG capsule          Plan:     Periodic Controlled Substance Monitoring: Possible medication side effects, risk of tolerance/dependence & alternative treatments discussed., No signs of potential drug abuse or diversion identified. , Assessed functional status. MARILYN Mann CNP)    Orders Placed This Encounter   Medications    HYDROcodone-acetaminophen (NORCO) 5-325 MG per tablet     Sig: Take 1 tablet by mouth daily as needed for Pain for up to 30 days.      Dispense:  30 tablet     Refill:  0     Reduce doses taken as pain becomes manageable    gabapentin (NEURONTIN) 100 MG capsule     Sig: Take 1 capsule by mouth daily as needed (leg or back pain) for up to 30 days. Dispense:  30 capsule     Refill:  1         Orders Placed This Encounter   Procedures    Izabel Maxwell DPM, Podiatry, [place]     Referral Priority:   Routine     Referral Type:   Eval and Treat     Referral Reason:   Specialty Services Required     Referred to Provider:   Renee Guerrier DPM     Requested Specialty:   Podiatry     Number of Visits Requested:   1    CHG FLUOROSCOPIC GUIDANCE NEEDLE PLACEMENT ADD ON     Standing Status:   Future     Standing Expiration Date:   4/17/2023    [place] ARTHROCENTESIS ASPIR&/INJ MAJOR JT/BURSA W/O US     Knee steroid     Standing Status:   Future     Standing Expiration Date:   4/17/2023     -takes elavil and ropinirole at night per PCP  -referral to Dr Robi Winter for feet pain and nail care  -order bilateral knee steroid injections for pain and function  -Refill Norco 5 mg daily as needed pain #30  Refill gabapentin daily #30 with 1 refill      Discussed options with the patient today. Anatomic model pathology was shown and reviewed with pt. All questions were answered. Relevant imaging reviewed, NDP reviewed and pain generators reviewed. Pt verbalized understanding and agrees with above plan. Will continue medications as they do help pt function with ADL and improve quality of life. Pt understands potential side effects from opioids such as constipation, dry mouth, dizziness, opioid use disorder, and overdose. Pt has failed non opioid therapy and decision was made to prescribe opioids to help with daily function and improve quality of life. Discussed the principles of opioid tolerance as well as the concerns regarding opioid diversion, misuse, and abuse. Patient is responsible to safely store and appropriately dispose of the medication.      Discussed the risks of respiratory depression and death while on pain medications, especially when combined with other sedative substances. Discussed the elevated risks of excessive sedation while on pain medications. Advised him against driving or operating heavy machinery or performing any activities where he may harm himself or others while on pain medications. Particular caution was emphasized especially during dose adjustments and medication changes. OARRS was reviewed. UTOX from 6/2022 appropriate; ORT score = 0  Daily MME 5  Narcan prescribed 12/2021 and understands the proper use in the event of an overdose. Follow up:  Return in about 4 weeks (around 2/14/2023) for review meds and reassess pain.     Renetta Kirk, APRN - CNP

## 2023-01-17 NOTE — TELEPHONE ENCOUNTER
BILAT KNEE STEROID INJECTION     NO AUTH REQUIRED    OK to schedule procedure approved as above. Please note sides/levels approved and date range. (If applicable, sides/levels approved may differ from those ordered)    TO BE SCHEDULED WITH DR. Izabella Jacobs

## 2023-01-18 NOTE — TELEPHONE ENCOUNTER
Patient's granddaughter returned call and scheduled BILAT KNEE STEROID INJECTION      NO AUTH REQUIRED

## 2023-01-24 DIAGNOSIS — B35.1 ONYCHOMYCOSIS: ICD-10-CM

## 2023-01-24 DIAGNOSIS — G25.81 RESTLESS LEG SYNDROME: ICD-10-CM

## 2023-01-24 RX ORDER — POTASSIUM CHLORIDE 20 MEQ/1
TABLET, EXTENDED RELEASE ORAL
Qty: 60 TABLET | Refills: 0 | Status: SHIPPED | OUTPATIENT
Start: 2023-01-24

## 2023-01-24 RX ORDER — ROPINIROLE 0.5 MG/1
TABLET, FILM COATED ORAL
Qty: 30 TABLET | Refills: 0 | Status: SHIPPED | OUTPATIENT
Start: 2023-01-24

## 2023-01-24 RX ORDER — ALBUTEROL SULFATE 90 UG/1
1 AEROSOL, METERED RESPIRATORY (INHALATION) EVERY 6 HOURS PRN
Qty: 18 G | Refills: 3 | Status: SHIPPED | OUTPATIENT
Start: 2023-01-24

## 2023-01-24 NOTE — TELEPHONE ENCOUNTER
patient requesting medication refill. Please approve or deny this request.    Rx requested:  Requested Prescriptions     Pending Prescriptions Disp Refills    ciclopirox (PENLAC) 8 % solution 6 mL 1     Sig: Apply topically nightly.     rOPINIRole (REQUIP) 0.5 MG tablet 30 tablet 0     Sig: Take one tablet po qhs    potassium chloride (KLOR-CON M20) 20 MEQ extended release tablet 60 tablet     albuterol sulfate HFA (PROVENTIL;VENTOLIN;PROAIR) 108 (90 Base) MCG/ACT inhaler 18 g 3     Sig: Inhale 1 puff into the lungs every 6 hours as needed for Wheezing or Shortness of Breath         Last Office Visit:   10/14/2022      Next Visit Date:  Future Appointments   Date Time Provider Jaylin Brownlee   1/24/2023 11:00 AM MD IFEANYI Paredes [facility] AT [place]   2/15/2023 10:15 AM MARILYN Raygoza - PAOLA SUGGS [facility] AT [place]   2/24/2023 10:00 AM ELIJAH Ram [facility] AT [place]   4/14/2023  3:30 PM Ashley Conn, [address]

## 2023-02-15 ENCOUNTER — OFFICE VISIT (OUTPATIENT)
Dept: PAIN MANAGEMENT | Age: 88
End: 2023-02-15
Payer: COMMERCIAL

## 2023-02-15 VITALS
DIASTOLIC BLOOD PRESSURE: 78 MMHG | TEMPERATURE: 97.7 F | BODY MASS INDEX: 33.8 KG/M2 | SYSTOLIC BLOOD PRESSURE: 136 MMHG | WEIGHT: 198 LBS | HEIGHT: 64 IN

## 2023-02-15 DIAGNOSIS — R29.898 WEAKNESS OF BOTH LOWER EXTREMITIES: ICD-10-CM

## 2023-02-15 DIAGNOSIS — M54.17 LUMBOSACRAL RADICULITIS: ICD-10-CM

## 2023-02-15 DIAGNOSIS — M17.0 BILATERAL PRIMARY OSTEOARTHRITIS OF KNEE: Primary | ICD-10-CM

## 2023-02-15 PROCEDURE — 1036F TOBACCO NON-USER: CPT | Performed by: NURSE PRACTITIONER

## 2023-02-15 PROCEDURE — 3075F SYST BP GE 130 - 139MM HG: CPT | Performed by: NURSE PRACTITIONER

## 2023-02-15 PROCEDURE — 3078F DIAST BP <80 MM HG: CPT | Performed by: NURSE PRACTITIONER

## 2023-02-15 PROCEDURE — G8400 PT W/DXA NO RESULTS DOC: HCPCS | Performed by: NURSE PRACTITIONER

## 2023-02-15 PROCEDURE — G8427 DOCREV CUR MEDS BY ELIG CLIN: HCPCS | Performed by: NURSE PRACTITIONER

## 2023-02-15 PROCEDURE — 99213 OFFICE O/P EST LOW 20 MIN: CPT | Performed by: NURSE PRACTITIONER

## 2023-02-15 PROCEDURE — 1123F ACP DISCUSS/DSCN MKR DOCD: CPT | Performed by: NURSE PRACTITIONER

## 2023-02-15 PROCEDURE — 1090F PRES/ABSN URINE INCON ASSESS: CPT | Performed by: NURSE PRACTITIONER

## 2023-02-15 PROCEDURE — G8484 FLU IMMUNIZE NO ADMIN: HCPCS | Performed by: NURSE PRACTITIONER

## 2023-02-15 PROCEDURE — G8417 CALC BMI ABV UP PARAM F/U: HCPCS | Performed by: NURSE PRACTITIONER

## 2023-02-15 RX ORDER — GABAPENTIN 100 MG/1
100 CAPSULE ORAL DAILY PRN
Qty: 90 CAPSULE | Refills: 0 | Status: SHIPPED | OUTPATIENT
Start: 2023-02-15 | End: 2023-05-16

## 2023-02-15 ASSESSMENT — ENCOUNTER SYMPTOMS
CONSTIPATION: 0
RESPIRATORY NEGATIVE: 1
BACK PAIN: 0
DIARRHEA: 0

## 2023-02-15 NOTE — PROGRESS NOTES
Pt states she doesn't want to continue the hydrocodone due to constipation, asking for a bigger quantity of gabapentin

## 2023-02-15 NOTE — PROGRESS NOTES
Patient: Serg Wagoner  YOB: 1935  Date: 2/15/23        Subjective:     Serg Wagoner is a 82 y.o. 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   Family History   Problem Relation Age of Onset    High Cholesterol Mother     Arthritis Mother     High Cholesterol Father     Heart Failure Father     Arthritis Father     Heart Disease Father     High Cholesterol Sister     Heart Failure Sister     Heart Failure Maternal Grandmother     High Cholesterol Maternal Grandmother     Heart Failure Paternal Grandfather     High Cholesterol Paternal Grandfather      Social History     Socioeconomic History    Marital status:      Spouse name: Not on file    Number of children: Not on file    Years of education: Not on file    Highest education level: Not on file   Occupational History    Not on file   Tobacco Use    Smoking status: Never    Smokeless tobacco: Never   Substance and Sexual Activity    Alcohol use: No    Drug use: No    Sexual activity: Not Currently   Other Topics Concern    Not on file   Social History Narrative    Not on file     Social Determinants of Health     Financial Resource Strain: Not on file   Food Insecurity: Not on file   Transportation Needs: Not on file   Physical Activity: Not on file   Stress: Not on file   Social Connections: Not on file   Intimate Partner Violence: Not on file   Housing Stability: Not on file       Current Outpatient Medications on File Prior to Visit   Medication Sig Dispense Refill    ciclopirox (PENLAC) 8 % solution Apply topically nightly.  6 mL 1    rOPINIRole (REQUIP) 0.5 MG tablet Take one tablet po qhs 30 tablet 0    potassium chloride (KLOR-CON M20) 20 MEQ extended release tablet Klor-Con M20 mEq tablet,extended release   TAKE ONE TABLET BY MOUTH TWICE DAILY AS NEEDED 60 tablet 0    albuterol sulfate HFA (PROVENTIL;VENTOLIN;PROAIR) 108 (90 Base) MCG/ACT inhaler Inhale 1 puff into the lungs every 6 hours as needed for Wheezing or Shortness of Breath 18 g 3    ALBUTEROL IN Inhale into the lungs      diphenhydrAMINE HCl (BENADRYL ALLERGY PO) Take by mouth      SYNTHROID 88 MCG tablet Take 1 tablet by mouth Daily 30 tablet 3    lidocaine (LMX) 4 % cream Apply a half dollar sized amount to intact skin topically up to twice daily as needed for pain 45 g 2    furosemide (LASIX) 20 MG tablet TAKE 1 TABLET BY MOUTH ONCE DAILY AS NEEDED FOR SWELLING 30 tablet 5    losartan (COZAAR) 50 mg tablet Take 1 tablet by mouth daily 90 tablet 3    tiotropium (SPIRIVA RESPIMAT) 2.5 MCG/ACT AERS inhaler Inhale 2 puffs into the lungs daily 3 each 3    amitriptyline (ELAVIL) 10 MG tablet TAKE TWO TABLETS BY MOUTH ONCE DAILY AT NIGHT 180 tablet 3    Cholecalciferol (VITAMIN D) 50 MCG (2000 UT) CAPS capsule Take by mouth daily      folic acid (FOLVITE) 1 MG tablet Take 1 tablet by mouth daily 90 tablet 3    cyanocobalamin ([facility]S VITAMIN B12) 1000 MCG tablet Take 1 tablet by mouth daily 90 tablet 3    naloxone 4 MG/0.1ML LIQD nasal spray 1 spray by Nasal route as needed for Opioid Reversal 1 each 0    esomeprazole (NEXIUM) 40 MG delayed release capsule Take 1 capsule po 30 minutes before your largest meal      diclofenac sodium (VOLTAREN) 1 % GEL Apply 4 g topically 4 times daily 240 g 2     No current facility-administered medications on file prior to visit. 81 yo female  for a f/u of chronic knee , low back and feet. Upper back, low back . Pain to knees 8/10. Didn't get injections ordered. Doesn't want them done. She stopped norco due to constipation. She would like to continue gabapentin 100mg daily with 90 day supply. Her granddaugher Lacie Payne is here, also her aide Jayme Nolasco. Had covid pneumonia 2023. She is looking for another PCP. Has appt with podiatry next week. Insurance would no longer pay for PT so she stopped and was given some home exercises to work on. Injections helped somewhat. Knees are ok if shes sitting. Pain is worse with movement. Says she has not been walking for about a year. Has an aide who comes 5 days/ week for an hour and patients daughter comes on weekends. Says she is able to transfer. Pt feels pain level and functioning improves with prescribed medications and is able to perform ADLs. Pt feels that walking aggravates the pain. Pt denies radiating numbness and tingling.  , he was terminal, that is why she wasn't able to keep her appointments. She declines MRI due to claustrophobia, doesn't want to take oral anxiolytics and doesn't want to be put to sleep for MRI either. Voltaren helped her. Didn't do knee injections. Got knee braces but says they dont fit her legs. Using norco and diclofenac gel. 22 BL knee inj      Review of Systems   Constitutional: Negative. Negative for activity change and unexpected weight change. HENT: Negative. Negative for hearing loss. Respiratory: Negative. Cardiovascular:  Negative for leg swelling. Gastrointestinal:  Negative for constipation and diarrhea. Genitourinary: Negative. Musculoskeletal:  Positive for arthralgias. Negative for back pain, gait problem, joint swelling and neck pain. Skin:  Negative for rash.    Neurological:  Negative for dizziness, weakness, numbness and headaches. Psychiatric/Behavioral: Negative. Negative for sleep disturbance. Objective:     Vitals:  /78 (Site: Left Upper Arm, Position: Sitting)   Temp 97.7 °F (36.5 °C) (Temporal)   Ht 5' 4\" (1.626 m)   Wt 198 lb (89.8 kg)   BMI 33.99 kg/m² Pain Score:   6 (with alot movement)    Physical Exam  Vitals reviewed. Constitutional:       Appearance: She is well-developed. HENT:      Head: Normocephalic. Nose: Nose normal.   Pulmonary:      Effort: Pulmonary effort is normal.   Musculoskeletal:      Cervical back: Normal range of motion and neck supple. Lumbar back: No tenderness. Decreased range of motion. Negative right straight leg raise test and negative left straight leg raise test.      Right knee: Decreased range of motion. Tenderness present. Left knee: Decreased range of motion. Tenderness present. Comments: In wheelchair   Lymphadenopathy:      Cervical: No cervical adenopathy. Skin:     General: Skin is warm and dry. Findings: No erythema or rash. Neurological:      Mental Status: She is alert and oriented to person, place, and time. Cranial Nerves: No cranial nerve deficit. Deep Tendon Reflexes: Reflexes are normal and symmetric. Reflexes normal.   Psychiatric:         Mood and Affect: Mood normal.         Behavior: Behavior normal.         Thought Content: Thought content normal.         Judgment: Judgment normal.          Assessment:        Diagnosis Orders   1. Bilateral primary osteoarthritis of knee  Urine Drug Screen      2. Weakness of both lower extremities  gabapentin (NEURONTIN) 100 MG capsule      3. Lumbosacral radiculitis            Plan:     Periodic Controlled Substance Monitoring: Possible medication side effects, risk of tolerance/dependence & alternative treatments discussed.  MARILYN Wilson - CNP)    Orders Placed This Encounter   Medications    gabapentin (NEURONTIN) 100 MG capsule Sig: Take 1 capsule by mouth daily as needed (leg or back pain) for up to 90 days. Dispense:  90 capsule     Refill:  0       Orders Placed This Encounter   Procedures    Urine Drug Screen     -she has stopped norco due to constipation  -took gabapentin last night  -She continues benefit for her leg pain from the gabapentin. We will refill gabapentin 100 mg daily, #90.  -I gave her information for finding a new PCP that she will select and make an appointment.  -When she is established with a family doctor she can have them take over her gabapentin since she is no longer getting narcotics and does not want any injections. Discussed options with the patient today. Anatomic model pathology was shown and reviewed with pt. All questions were answered. Relevant imaging reviewed, NDP reviewed and pain generators reviewed. Pt verbalized understanding and agrees with above plan. Will continue medications as they do help pt function with ADL and improve quality of life. Pt understands potential side effects from opioids such as constipation, dry mouth, dizziness, opioid use disorder, and overdose. Pt has failed non opioid therapy and decision was made to prescribe opioids to help with daily function and improve quality of life. Discussed the principles of opioid tolerance as well as the concerns regarding opioid diversion, misuse, and abuse. Patient is responsible to safely store and appropriately dispose of the medication. Discussed the risks of respiratory depression and death while on pain medications, especially when combined with other sedative substances. Discussed the elevated risks of excessive sedation while on pain medications. Advised him against driving or operating heavy machinery or performing any activities where he may harm himself or others while on pain medications. Particular caution was emphasized especially during dose adjustments and medication changes. OARRS was reviewed.    UTOX from 6/2022 appropriate; ORT score = 0  Daily MME 5  Narcan prescribed 12/2021 and understands the proper use in the event of an overdose. Follow up:  Return in about 3 months (around 5/15/2023) for review meds and reassess pain.     Brittney Smith, APRN - CNP

## 2023-02-27 DIAGNOSIS — G25.81 RESTLESS LEG SYNDROME: ICD-10-CM

## 2023-02-27 DIAGNOSIS — E03.9 HYPOTHYROIDISM, UNSPECIFIED TYPE: ICD-10-CM

## 2023-02-27 RX ORDER — ROPINIROLE 0.5 MG/1
TABLET, FILM COATED ORAL
Qty: 30 TABLET | Refills: 0 | Status: SHIPPED | OUTPATIENT
Start: 2023-02-27

## 2023-02-27 RX ORDER — ROPINIROLE 0.5 MG/1
TABLET, FILM COATED ORAL
Qty: 30 TABLET | Refills: 0 | OUTPATIENT
Start: 2023-02-27

## 2023-02-27 NOTE — TELEPHONE ENCOUNTER
Patient requesting medication refill.  Please approve or deny this request.    Rx requested:  Requested Prescriptions     Pending Prescriptions Disp Refills    rOPINIRole (REQUIP) 0.5 MG tablet 30 tablet 0     Sig: Take one tablet po qhs         Last Office Visit:   10/14/2022      Next Visit Date:  Future Appointments   Date Time Provider Jaylin Brownlee   4/14/2023  3:30 PM MARILYN John - CNP [facility]   5/16/2023 10:30 AM Farzad Galan APRN - CNP [address]

## 2023-03-09 DIAGNOSIS — E53.8 VITAMIN B12 DEFICIENCY: ICD-10-CM

## 2023-03-09 DIAGNOSIS — E03.9 HYPOTHYROIDISM, UNSPECIFIED TYPE: ICD-10-CM

## 2023-03-09 RX ORDER — LEVOTHYROXINE SODIUM 88 MCG
88 TABLET ORAL DAILY
Qty: 30 TABLET | Refills: 3 | Status: SHIPPED | OUTPATIENT
Start: 2023-03-09

## 2023-03-09 NOTE — TELEPHONE ENCOUNTER
patient requesting medication refill.  Please approve or deny this request.    Rx requested:  Requested Prescriptions     Pending Prescriptions Disp Refills    cyanocobalamin (CVS VITAMIN B12) 1000 MCG tablet 90 tablet 3     Sig: Take 1 tablet by mouth daily    SYNTHROID 88 MCG tablet 30 tablet 3     Sig: Take 1 tablet by mouth Daily         Last Office Visit:   10/14/2022      Next Visit Date:  Future Appointments   Date Time Provider Jaylin Willisisti   4/14/2023  3:30 PM Julia Alarcon, [address]   5/16/2023 10:30 AM Shanell Lawson APRN - CNP [address]

## 2023-04-04 DIAGNOSIS — G25.81 RESTLESS LEG SYNDROME: ICD-10-CM

## 2023-04-05 RX ORDER — ROPINIROLE 0.5 MG/1
TABLET, FILM COATED ORAL
Qty: 30 TABLET | Refills: 0 | Status: SHIPPED | OUTPATIENT
Start: 2023-04-05

## 2023-04-25 ENCOUNTER — OFFICE VISIT (OUTPATIENT)
Dept: FAMILY MEDICINE CLINIC | Age: 88
End: 2023-04-25
Payer: COMMERCIAL

## 2023-04-25 VITALS
RESPIRATION RATE: 14 BRPM | SYSTOLIC BLOOD PRESSURE: 120 MMHG | HEIGHT: 64 IN | HEART RATE: 90 BPM | OXYGEN SATURATION: 95 % | DIASTOLIC BLOOD PRESSURE: 82 MMHG | WEIGHT: 208 LBS | TEMPERATURE: 97.5 F | BODY MASS INDEX: 35.51 KG/M2

## 2023-04-25 DIAGNOSIS — E03.9 HYPOTHYROIDISM, UNSPECIFIED TYPE: Primary | ICD-10-CM

## 2023-04-25 DIAGNOSIS — I10 ESSENTIAL HYPERTENSION: ICD-10-CM

## 2023-04-25 DIAGNOSIS — E53.8 FOLIC ACID DEFICIENCY: ICD-10-CM

## 2023-04-25 DIAGNOSIS — J44.9 CHRONIC OBSTRUCTIVE PULMONARY DISEASE, UNSPECIFIED COPD TYPE (HCC): ICD-10-CM

## 2023-04-25 DIAGNOSIS — G25.81 RESTLESS LEG SYNDROME: ICD-10-CM

## 2023-04-25 DIAGNOSIS — E53.8 VITAMIN B12 DEFICIENCY: ICD-10-CM

## 2023-04-25 DIAGNOSIS — B35.1 ONYCHOMYCOSIS: ICD-10-CM

## 2023-04-25 DIAGNOSIS — E78.5 HYPERLIPIDEMIA, UNSPECIFIED HYPERLIPIDEMIA TYPE: ICD-10-CM

## 2023-04-25 PROCEDURE — 3074F SYST BP LT 130 MM HG: CPT | Performed by: FAMILY MEDICINE

## 2023-04-25 PROCEDURE — G8427 DOCREV CUR MEDS BY ELIG CLIN: HCPCS | Performed by: FAMILY MEDICINE

## 2023-04-25 PROCEDURE — 3079F DIAST BP 80-89 MM HG: CPT | Performed by: FAMILY MEDICINE

## 2023-04-25 PROCEDURE — 1036F TOBACCO NON-USER: CPT | Performed by: FAMILY MEDICINE

## 2023-04-25 PROCEDURE — 1123F ACP DISCUSS/DSCN MKR DOCD: CPT | Performed by: FAMILY MEDICINE

## 2023-04-25 PROCEDURE — G8400 PT W/DXA NO RESULTS DOC: HCPCS | Performed by: FAMILY MEDICINE

## 2023-04-25 PROCEDURE — 3023F SPIROM DOC REV: CPT | Performed by: FAMILY MEDICINE

## 2023-04-25 PROCEDURE — G8417 CALC BMI ABV UP PARAM F/U: HCPCS | Performed by: FAMILY MEDICINE

## 2023-04-25 PROCEDURE — 99214 OFFICE O/P EST MOD 30 MIN: CPT | Performed by: FAMILY MEDICINE

## 2023-04-25 PROCEDURE — 1090F PRES/ABSN URINE INCON ASSESS: CPT | Performed by: FAMILY MEDICINE

## 2023-04-25 RX ORDER — ROPINIROLE 0.5 MG/1
TABLET, FILM COATED ORAL
Qty: 90 TABLET | Refills: 1 | Status: SHIPPED | OUTPATIENT
Start: 2023-04-25

## 2023-04-25 SDOH — ECONOMIC STABILITY: FOOD INSECURITY: WITHIN THE PAST 12 MONTHS, YOU WORRIED THAT YOUR FOOD WOULD RUN OUT BEFORE YOU GOT MONEY TO BUY MORE.: NEVER TRUE

## 2023-04-25 SDOH — ECONOMIC STABILITY: HOUSING INSECURITY
IN THE LAST 12 MONTHS, WAS THERE A TIME WHEN YOU DID NOT HAVE A STEADY PLACE TO SLEEP OR SLEPT IN A SHELTER (INCLUDING NOW)?: NO

## 2023-04-25 SDOH — ECONOMIC STABILITY: FOOD INSECURITY: WITHIN THE PAST 12 MONTHS, THE FOOD YOU BOUGHT JUST DIDN'T LAST AND YOU DIDN'T HAVE MONEY TO GET MORE.: NEVER TRUE

## 2023-04-25 SDOH — ECONOMIC STABILITY: INCOME INSECURITY: HOW HARD IS IT FOR YOU TO PAY FOR THE VERY BASICS LIKE FOOD, HOUSING, MEDICAL CARE, AND HEATING?: NOT HARD AT ALL

## 2023-04-25 ASSESSMENT — ENCOUNTER SYMPTOMS
DIARRHEA: 0
VOMITING: 0
BACK PAIN: 1
COUGH: 0

## 2023-04-25 ASSESSMENT — PATIENT HEALTH QUESTIONNAIRE - PHQ9
5. POOR APPETITE OR OVEREATING: 0
10. IF YOU CHECKED OFF ANY PROBLEMS, HOW DIFFICULT HAVE THESE PROBLEMS MADE IT FOR YOU TO DO YOUR WORK, TAKE CARE OF THINGS AT HOME, OR GET ALONG WITH OTHER PEOPLE: 0
SUM OF ALL RESPONSES TO PHQ QUESTIONS 1-9: 0
6. FEELING BAD ABOUT YOURSELF - OR THAT YOU ARE A FAILURE OR HAVE LET YOURSELF OR YOUR FAMILY DOWN: 0
1. LITTLE INTEREST OR PLEASURE IN DOING THINGS: 0
2. FEELING DOWN, DEPRESSED OR HOPELESS: 0
SUM OF ALL RESPONSES TO PHQ QUESTIONS 1-9: 0
SUM OF ALL RESPONSES TO PHQ9 QUESTIONS 1 & 2: 0
8. MOVING OR SPEAKING SO SLOWLY THAT OTHER PEOPLE COULD HAVE NOTICED. OR THE OPPOSITE, BEING SO FIGETY OR RESTLESS THAT YOU HAVE BEEN MOVING AROUND A LOT MORE THAN USUAL: 0
3. TROUBLE FALLING OR STAYING ASLEEP: 0
SUM OF ALL RESPONSES TO PHQ QUESTIONS 1-9: 0
7. TROUBLE CONCENTRATING ON THINGS, SUCH AS READING THE NEWSPAPER OR WATCHING TELEVISION: 0
9. THOUGHTS THAT YOU WOULD BE BETTER OFF DEAD, OR OF HURTING YOURSELF: 0
4. FEELING TIRED OR HAVING LITTLE ENERGY: 0
SUM OF ALL RESPONSES TO PHQ QUESTIONS 1-9: 0

## 2023-04-25 NOTE — PROGRESS NOTES
Subjective:      Patient ID: José Miguel Mirza is a 82 y.o. female    Hypertension  The current episode started more than 1 year ago. Past treatments include angiotensin blockers. The current treatment provides moderate improvement. Hyperlipidemia  The current episode started more than 1 year ago. Other  The current episode started more than 1 year ago. Associated symptoms include arthralgias. Pertinent negatives include no chills, coughing, fever, rash, vomiting or weakness. Here to establish. Previously seen by  and CHERISE-Ashley. Has hx of hypothyroidism and htn and lipid and rls and chronic back pain and leg pain and copd and b12 deficiency. Does use wheel chair at home for ambulation. .  Also has had few weeks of right great toe discoloration. Would like to see podiatrist   Had been seeing pain management but not taking neurontin much at all at this point   admits not taking bp medication on regular basis    Review of Systems   Constitutional:  Negative for chills and fever. Respiratory:  Negative for cough. Gastrointestinal:  Negative for diarrhea and vomiting. Musculoskeletal:  Positive for arthralgias and back pain. Skin:  Negative for rash. Neurological:  Negative for weakness. Reviewed allergy, medical, social, surgical, family and med list changes and updated   Files   Controlled substances monitoring: no signs of potential drug abuse or diversion identified and OARRS report reviewed today- activity consistent with treatment plan.    Social History     Socioeconomic History    Marital status:      Spouse name: None    Number of children: None    Years of education: None    Highest education level: None   Tobacco Use    Smoking status: Never    Smokeless tobacco: Never   Substance and Sexual Activity    Alcohol use: No    Drug use: No    Sexual activity: Not Currently     Social Determinants of Health     Financial Resource Strain: Low Risk     Difficulty of Paying Living

## 2023-05-01 ENCOUNTER — OFFICE VISIT (OUTPATIENT)
Dept: FAMILY MEDICINE CLINIC | Age: 88
End: 2023-05-01
Payer: COMMERCIAL

## 2023-05-01 VITALS
TEMPERATURE: 97.5 F | DIASTOLIC BLOOD PRESSURE: 60 MMHG | BODY MASS INDEX: 35.51 KG/M2 | WEIGHT: 208 LBS | HEART RATE: 71 BPM | SYSTOLIC BLOOD PRESSURE: 138 MMHG | RESPIRATION RATE: 15 BRPM | OXYGEN SATURATION: 96 % | HEIGHT: 64 IN

## 2023-05-01 DIAGNOSIS — L03.119 CELLULITIS OF LOWER EXTREMITY, UNSPECIFIED LATERALITY: ICD-10-CM

## 2023-05-01 DIAGNOSIS — R60.0 PEDAL EDEMA: Primary | ICD-10-CM

## 2023-05-01 PROCEDURE — 3078F DIAST BP <80 MM HG: CPT | Performed by: NURSE PRACTITIONER

## 2023-05-01 PROCEDURE — G8417 CALC BMI ABV UP PARAM F/U: HCPCS | Performed by: NURSE PRACTITIONER

## 2023-05-01 PROCEDURE — 3075F SYST BP GE 130 - 139MM HG: CPT | Performed by: NURSE PRACTITIONER

## 2023-05-01 PROCEDURE — 1036F TOBACCO NON-USER: CPT | Performed by: NURSE PRACTITIONER

## 2023-05-01 PROCEDURE — G8427 DOCREV CUR MEDS BY ELIG CLIN: HCPCS | Performed by: NURSE PRACTITIONER

## 2023-05-01 PROCEDURE — G8400 PT W/DXA NO RESULTS DOC: HCPCS | Performed by: NURSE PRACTITIONER

## 2023-05-01 PROCEDURE — 1123F ACP DISCUSS/DSCN MKR DOCD: CPT | Performed by: NURSE PRACTITIONER

## 2023-05-01 PROCEDURE — 99213 OFFICE O/P EST LOW 20 MIN: CPT | Performed by: NURSE PRACTITIONER

## 2023-05-01 PROCEDURE — 1090F PRES/ABSN URINE INCON ASSESS: CPT | Performed by: NURSE PRACTITIONER

## 2023-05-01 RX ORDER — CEPHALEXIN 500 MG/1
500 CAPSULE ORAL 2 TIMES DAILY
Qty: 20 CAPSULE | Refills: 0 | Status: SHIPPED | OUTPATIENT
Start: 2023-05-01 | End: 2023-05-11

## 2023-05-01 ASSESSMENT — ENCOUNTER SYMPTOMS
EYE PAIN: 0
SORE THROAT: 0
NAUSEA: 0
SHORTNESS OF BREATH: 0
TROUBLE SWALLOWING: 0
COUGH: 0
VOMITING: 0
ABDOMINAL PAIN: 0
DIARRHEA: 0
CONSTIPATION: 0
RHINORRHEA: 0
EYE ITCHING: 0
EYE REDNESS: 0
CHEST TIGHTNESS: 0
WHEEZING: 0
EYE DISCHARGE: 0

## 2023-05-01 ASSESSMENT — VISUAL ACUITY: OU: 1

## 2023-05-01 NOTE — PROGRESS NOTES
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 Social History     Socioeconomic History    Marital status:      Spouse name: None    Number of children: None    Years of education: None    Highest education level: None   Tobacco Use    Smoking status: Never    Smokeless tobacco: Never   Substance and Sexual Activity    Alcohol use: No    Drug use: No    Sexual activity: Not Currently     Social Determinants of Health     Financial Resource Strain: Low Risk     Difficulty of Paying Living Expenses: Not hard at all   Food Insecurity: No Food Insecurity    Worried About Running Out of Food in the Last Year: Never true    Ran Out of Food in the Last Year: Never true   Transportation Needs: Unknown    Lack of Transportation (Non-Medical): No   Housing Stability: Unknown    Unstable Housing in the Last Year: No     Current Outpatient Medications on File Prior to Visit   Medication Sig Dispense Refill    rOPINIRole (REQUIP) 0.5 MG tablet TAKE 1 TABLET BY MOUTH AT BEDTIME. 90 tablet 1    cyanocobalamin (CVS VITAMIN B12) 1000 MCG tablet Take 1 tablet by mouth daily 90 tablet 3    SYNTHROID 88 MCG tablet Take 1 tablet by mouth Daily 30 tablet 3    gabapentin (NEURONTIN) 100 MG capsule Take 1 capsule by mouth daily as needed (leg or back pain) for up to 90 days. 90 capsule 0    ciclopirox (PENLAC) 8 % solution Apply topically nightly.  6 mL 1    potassium chloride (KLOR-CON M20) 20 MEQ extended release tablet Klor-Con M20 mEq tablet,extended release   TAKE ONE TABLET BY MOUTH TWICE DAILY AS NEEDED 60

## 2023-05-05 DIAGNOSIS — E53.8 VITAMIN B12 DEFICIENCY: ICD-10-CM

## 2023-05-05 DIAGNOSIS — E78.5 HYPERLIPIDEMIA, UNSPECIFIED HYPERLIPIDEMIA TYPE: ICD-10-CM

## 2023-05-05 DIAGNOSIS — E03.9 HYPOTHYROIDISM, UNSPECIFIED TYPE: ICD-10-CM

## 2023-05-05 DIAGNOSIS — I10 ESSENTIAL HYPERTENSION: ICD-10-CM

## 2023-05-05 LAB
ALBUMIN SERPL-MCNC: 4 G/DL (ref 3.5–4.6)
ALP SERPL-CCNC: 91 U/L (ref 40–130)
ALT SERPL-CCNC: 14 U/L (ref 0–33)
ANION GAP SERPL CALCULATED.3IONS-SCNC: 12 MEQ/L (ref 9–15)
AST SERPL-CCNC: 23 U/L (ref 0–35)
BASOPHILS # BLD: 0.1 K/UL (ref 0–0.2)
BASOPHILS NFR BLD: 1.3 %
BILIRUB SERPL-MCNC: 0.3 MG/DL (ref 0.2–0.7)
BUN SERPL-MCNC: 13 MG/DL (ref 8–23)
CALCIUM SERPL-MCNC: 9.4 MG/DL (ref 8.5–9.9)
CHLORIDE SERPL-SCNC: 101 MEQ/L (ref 95–107)
CHOLEST SERPL-MCNC: 203 MG/DL (ref 0–199)
CO2 SERPL-SCNC: 26 MEQ/L (ref 20–31)
CREAT SERPL-MCNC: 0.77 MG/DL (ref 0.5–0.9)
EOSINOPHIL # BLD: 0.3 K/UL (ref 0–0.7)
EOSINOPHIL NFR BLD: 5.3 %
ERYTHROCYTE [DISTWIDTH] IN BLOOD BY AUTOMATED COUNT: 13.5 % (ref 11.5–14.5)
GLOBULIN SER CALC-MCNC: 3.1 G/DL (ref 2.3–3.5)
GLUCOSE SERPL-MCNC: 84 MG/DL (ref 70–99)
HCT VFR BLD AUTO: 38 % (ref 37–47)
HDLC SERPL-MCNC: 43 MG/DL (ref 40–59)
HGB BLD-MCNC: 12.3 G/DL (ref 12–16)
LDLC SERPL CALC-MCNC: 122 MG/DL (ref 0–129)
LYMPHOCYTES # BLD: 2.9 K/UL (ref 1–4.8)
LYMPHOCYTES NFR BLD: 44.7 %
MCH RBC QN AUTO: 27.7 PG (ref 27–31.3)
MCHC RBC AUTO-ENTMCNC: 32.3 % (ref 33–37)
MCV RBC AUTO: 85.9 FL (ref 79.4–94.8)
MONOCYTES # BLD: 0.6 K/UL (ref 0.2–0.8)
MONOCYTES NFR BLD: 9.3 %
NEUTROPHILS # BLD: 2.6 K/UL (ref 1.4–6.5)
NEUTS SEG NFR BLD: 39.4 %
PLATELET # BLD AUTO: 225 K/UL (ref 130–400)
POTASSIUM SERPL-SCNC: 4.6 MEQ/L (ref 3.4–4.9)
PROT SERPL-MCNC: 7.1 G/DL (ref 6.3–8)
RBC # BLD AUTO: 4.43 M/UL (ref 4.2–5.4)
SODIUM SERPL-SCNC: 139 MEQ/L (ref 135–144)
TRIGL SERPL-MCNC: 192 MG/DL (ref 0–150)
TSH SERPL-MCNC: 2.07 UIU/ML (ref 0.44–3.86)
WBC # BLD AUTO: 6.5 K/UL (ref 4.8–10.8)

## 2023-05-06 LAB
FOLATE: >20 NG/ML
VITAMIN B-12: 866 PG/ML (ref 232–1245)

## 2023-05-08 RX ORDER — AMOXICILLIN AND CLAVULANATE POTASSIUM 500; 125 MG/1; MG/1
1 TABLET, FILM COATED ORAL 3 TIMES DAILY
Qty: 21 TABLET | Refills: 0 | Status: SHIPPED | OUTPATIENT
Start: 2023-05-08 | End: 2023-05-15

## 2023-05-08 NOTE — TELEPHONE ENCOUNTER
----- Message from Sal Morataya sent at 5/7/2023  8:37 PM EDT -----  Regarding: Follow up to urgent care visit  Contact: [phone]  Hello -this is Susannah granddaughter Amaryllis Boas. She lives with me. Currently she is being treated with Keflex and she has increasing redness and tenderness today. She is complaining of increased pain and burning today. I think she needs a stronger antibiotic. Can this be subscribed so we can pick it up Monday for her?     Thank you - Abel Ybarra  [phone]

## 2023-05-08 NOTE — TELEPHONE ENCOUNTER
Spoke with daughter is stated that Donna Pringle has been on keflex for 5 days and it is not helping. Increasing the dosage to 3 days, does not satisfy daughter. Can you please change antibiotic. Pt is scheduled on Wed 5-10-23 for follow up.

## 2023-05-17 ENCOUNTER — INITIAL CONSULT (OUTPATIENT)
Age: 88
End: 2023-05-17
Payer: COMMERCIAL

## 2023-05-17 VITALS — WEIGHT: 203 LBS | BODY MASS INDEX: 34.66 KG/M2 | TEMPERATURE: 97.8 F | HEIGHT: 64 IN

## 2023-05-17 DIAGNOSIS — M79.674 PAIN IN TOE OF RIGHT FOOT: Primary | ICD-10-CM

## 2023-05-17 DIAGNOSIS — B35.1 ONYCHOMYCOSIS: ICD-10-CM

## 2023-05-17 PROCEDURE — 1123F ACP DISCUSS/DSCN MKR DOCD: CPT | Performed by: PODIATRIST

## 2023-05-17 PROCEDURE — G8427 DOCREV CUR MEDS BY ELIG CLIN: HCPCS | Performed by: PODIATRIST

## 2023-05-17 PROCEDURE — G8400 PT W/DXA NO RESULTS DOC: HCPCS | Performed by: PODIATRIST

## 2023-05-17 PROCEDURE — 1036F TOBACCO NON-USER: CPT | Performed by: PODIATRIST

## 2023-05-17 PROCEDURE — 99203 OFFICE O/P NEW LOW 30 MIN: CPT | Performed by: PODIATRIST

## 2023-05-17 PROCEDURE — G8417 CALC BMI ABV UP PARAM F/U: HCPCS | Performed by: PODIATRIST

## 2023-05-17 PROCEDURE — 1090F PRES/ABSN URINE INCON ASSESS: CPT | Performed by: PODIATRIST

## 2023-05-17 ASSESSMENT — ENCOUNTER SYMPTOMS
BACK PAIN: 1
VOMITING: 0
NAUSEA: 0
SHORTNESS OF BREATH: 1

## 2023-05-18 NOTE — PROGRESS NOTES
[address]  [place] [address]  Dept: [phone]  Loc: [phone]       Salvador Workman  (1935)    5/17/23    Subjective     Salvador Workman is 82 y.o. female who complains today of:    Chief Complaint   Patient presents with    Nail Problem     Both feet     Salvador Workman is seen in consultation at the request of Sarina Llanos MD for evaluation of nail fungus. HPI: Patient presents with a complaint of nail fungus. Patient reports having mild dull pain. Patient states she has had discoloration of the nails for 3-4 years and states it has worsened over time. Previous treatment has included prescription antifungal solution that was prescribed in January, patient states she only applied it intermittently and stopped using it because there is no change in the nail color. Patient is not a diabetic. Patient reports chronic swelling of the legs secondary to CHF, she reports that she developed cellulitis recently, she has almost completed a course of oral antibiotics and the cellulitis appears to be resolving. Review of Systems   Constitutional:  Negative for chills and fever. HENT:  Positive for hearing loss. Respiratory:  Positive for shortness of breath. Cardiovascular:  Negative for chest pain. Gastrointestinal:  Negative for nausea and vomiting. Genitourinary:  Negative for difficulty urinating. Musculoskeletal:  Positive for back pain and gait problem. Skin:  Negative for wound. Neurological:  Negative for numbness. Hematological:  Bruises/bleeds easily. Psychiatric/Behavioral:  Positive for sleep disturbance. The patient is not a diabetic.    PCP/ Endocrinologist: Sarina Llanos MD   Date last seen: April 25, 2023    Allergies:  Azithromycin and Ceftin  [cefuroxime axetil]    Current Outpatient Medications on File Prior to Visit   Medication Sig Dispense

## 2023-05-30 ENCOUNTER — OFFICE VISIT (OUTPATIENT)
Dept: PAIN MANAGEMENT | Age: 88
End: 2023-05-30
Payer: COMMERCIAL

## 2023-05-30 VITALS
TEMPERATURE: 97.1 F | HEIGHT: 64 IN | BODY MASS INDEX: 34.15 KG/M2 | WEIGHT: 200 LBS | DIASTOLIC BLOOD PRESSURE: 82 MMHG | SYSTOLIC BLOOD PRESSURE: 130 MMHG

## 2023-05-30 DIAGNOSIS — M54.17 LUMBOSACRAL RADICULITIS: ICD-10-CM

## 2023-05-30 DIAGNOSIS — R29.898 WEAKNESS OF BOTH LOWER EXTREMITIES: ICD-10-CM

## 2023-05-30 DIAGNOSIS — R26.9 GAIT DIFFICULTY: ICD-10-CM

## 2023-05-30 DIAGNOSIS — M17.0 BILATERAL PRIMARY OSTEOARTHRITIS OF KNEE: Primary | ICD-10-CM

## 2023-05-30 PROCEDURE — 1123F ACP DISCUSS/DSCN MKR DOCD: CPT | Performed by: NURSE PRACTITIONER

## 2023-05-30 PROCEDURE — 99214 OFFICE O/P EST MOD 30 MIN: CPT | Performed by: NURSE PRACTITIONER

## 2023-05-30 PROCEDURE — 1036F TOBACCO NON-USER: CPT | Performed by: NURSE PRACTITIONER

## 2023-05-30 PROCEDURE — G8400 PT W/DXA NO RESULTS DOC: HCPCS | Performed by: NURSE PRACTITIONER

## 2023-05-30 PROCEDURE — G8417 CALC BMI ABV UP PARAM F/U: HCPCS | Performed by: NURSE PRACTITIONER

## 2023-05-30 PROCEDURE — 3079F DIAST BP 80-89 MM HG: CPT | Performed by: NURSE PRACTITIONER

## 2023-05-30 PROCEDURE — 1090F PRES/ABSN URINE INCON ASSESS: CPT | Performed by: NURSE PRACTITIONER

## 2023-05-30 PROCEDURE — G8427 DOCREV CUR MEDS BY ELIG CLIN: HCPCS | Performed by: NURSE PRACTITIONER

## 2023-05-30 PROCEDURE — 3075F SYST BP GE 130 - 139MM HG: CPT | Performed by: NURSE PRACTITIONER

## 2023-05-30 RX ORDER — GABAPENTIN 100 MG/1
100 CAPSULE ORAL DAILY PRN
Qty: 90 CAPSULE | Refills: 0 | Status: SHIPPED | OUTPATIENT
Start: 2023-05-30 | End: 2023-08-28

## 2023-05-30 ASSESSMENT — ENCOUNTER SYMPTOMS
CONSTIPATION: 0
DIARRHEA: 0
RESPIRATORY NEGATIVE: 1
BACK PAIN: 1

## 2023-05-30 NOTE — PROGRESS NOTES
Gastrointestinal:  Negative for constipation and diarrhea. Genitourinary: Negative. Musculoskeletal:  Positive for arthralgias and back pain. Negative for gait problem, joint swelling and neck pain. Skin:  Negative for rash. Neurological:  Negative for dizziness, weakness, numbness and headaches. Psychiatric/Behavioral: Negative. Negative for sleep disturbance. Objective:     Vitals:  /82   Temp 97.1 °F (36.2 °C)   Ht 5' 4\" (1.626 m)   Wt 200 lb (90.7 kg)   BMI 34.33 kg/m² Pain Score:   5    Physical Exam  Vitals reviewed. Constitutional:       Appearance: She is well-developed. HENT:      Head: Normocephalic. Nose: Nose normal.   Pulmonary:      Effort: Pulmonary effort is normal.   Musculoskeletal:      Cervical back: Normal range of motion and neck supple. Lumbar back: No tenderness. Decreased range of motion. Right knee: Decreased range of motion. Left knee: Decreased range of motion. Comments: Can stand for brief time, can not walk   Lymphadenopathy:      Cervical: No cervical adenopathy. Skin:     General: Skin is warm and dry. Findings: No erythema or rash. Neurological:      Mental Status: She is alert and oriented to person, place, and time. Cranial Nerves: No cranial nerve deficit. Deep Tendon Reflexes: Reflexes are normal and symmetric. Reflexes normal.   Psychiatric:         Mood and Affect: Mood normal.         Behavior: Behavior normal.         Thought Content: Thought content normal.         Judgment: Judgment normal.          Assessment:        Diagnosis Orders   1. Bilateral primary osteoarthritis of knee  XR KNEE BILATERAL STANDING    XR KNEE LEFT (3 VIEWS)    XR KNEE RIGHT (3 VIEWS)    Radha Eastman MD, Sports Medicine, [place]      2. Weakness of both lower extremities  gabapentin (NEURONTIN) 100 MG capsule      3. Lumbosacral radiculitis        4.  Gait difficulty  Radha Eastman MD, Sports

## 2023-06-01 DIAGNOSIS — M17.0 BILATERAL PRIMARY OSTEOARTHRITIS OF KNEE: ICD-10-CM

## 2023-06-05 ENCOUNTER — INITIAL CONSULT (OUTPATIENT)
Dept: SPORTS MEDICINE | Age: 88
End: 2023-06-05
Payer: COMMERCIAL

## 2023-06-05 VITALS — WEIGHT: 200 LBS | RESPIRATION RATE: 16 BRPM | BODY MASS INDEX: 34.15 KG/M2 | TEMPERATURE: 96.8 F | HEIGHT: 64 IN

## 2023-06-05 DIAGNOSIS — M17.0 PRIMARY OSTEOARTHRITIS OF BOTH KNEES: Primary | ICD-10-CM

## 2023-06-05 PROCEDURE — G8400 PT W/DXA NO RESULTS DOC: HCPCS | Performed by: FAMILY MEDICINE

## 2023-06-05 PROCEDURE — 99204 OFFICE O/P NEW MOD 45 MIN: CPT | Performed by: FAMILY MEDICINE

## 2023-06-05 PROCEDURE — 1090F PRES/ABSN URINE INCON ASSESS: CPT | Performed by: FAMILY MEDICINE

## 2023-06-05 PROCEDURE — 1036F TOBACCO NON-USER: CPT | Performed by: FAMILY MEDICINE

## 2023-06-05 PROCEDURE — 1123F ACP DISCUSS/DSCN MKR DOCD: CPT | Performed by: FAMILY MEDICINE

## 2023-06-05 PROCEDURE — G8427 DOCREV CUR MEDS BY ELIG CLIN: HCPCS | Performed by: FAMILY MEDICINE

## 2023-06-05 PROCEDURE — G8417 CALC BMI ABV UP PARAM F/U: HCPCS | Performed by: FAMILY MEDICINE

## 2023-06-05 ASSESSMENT — ENCOUNTER SYMPTOMS
CONSTIPATION: 0
BACK PAIN: 0
SHORTNESS OF BREATH: 0
DIARRHEA: 0
NAUSEA: 0

## 2023-06-05 NOTE — PROGRESS NOTES
[facility]) Physicians  Neurosurgery and Pain [facility]  [address] [facility] [facility] , [address] [facility], [place] [address]: [phone]  F: [phone]      Faiza Monteiro  (1935)    6/5/2023    Subjective:     Faiza Monteiro is 82 y.o. female who complains today of:    Chief Complaint   Patient presents with    Consultation    Knee Pain    Back Pain       HPI     This patient comes in complaining of many years of bilateral knee pain she states that she is here today in suggestion of her PCP for some options for her knee says she had a corticosteroid injection but probably about a year ago and she was wondering about those as well as any other options including braces says she has some very thin brace has been all seem to help her has been sent to physical therapy in the past and still does a physical therapy exercise program but now she says she is having struggles doing it  Allergies:  Azithromycin and Ceftin  [cefuroxime axetil]    Past Medical History:   Diagnosis Date    Arthritis     Arthritis of both knees     Chronic back pain     COPD (chronic obstructive pulmonary disease) (HCC)     Depression     Deviated septum     per patient     Emphysema (subcutaneous) (surgical) resulting from a procedure     Fibromyalgia     Irritable bowel syndrome     Lyme arthritis ([address]     Neuropathy     Pregnancy, GI problems     Pulmonary fibrosis ([address]     per patient     Restless leg syndrome     Spinal stenosis     Urinary incontinence      History reviewed. No pertinent surgical history.   Family History   Problem Relation Age of Onset    High Cholesterol Mother     Arthritis Mother     High Cholesterol Father     Heart Failure Father     Arthritis Father     Heart Disease Father     High Cholesterol Sister     Heart Failure Sister     Heart Failure Maternal Grandmother     High Cholesterol Maternal Grandmother     Heart Failure Paternal Grandfather     High Cholesterol Paternal Grandfather

## 2023-06-08 ENCOUNTER — TELEPHONE (OUTPATIENT)
Dept: SPORTS MEDICINE | Age: 88
End: 2023-06-08

## 2023-06-08 ENCOUNTER — PROCEDURE VISIT (OUTPATIENT)
Dept: SPORTS MEDICINE | Age: 88
End: 2023-06-08

## 2023-06-08 DIAGNOSIS — M17.0 PRIMARY OSTEOARTHRITIS OF BOTH KNEES: Primary | ICD-10-CM

## 2023-06-08 RX ORDER — LIDOCAINE HYDROCHLORIDE 10 MG/ML
6 INJECTION, SOLUTION INFILTRATION; PERINEURAL ONCE
Status: COMPLETED | OUTPATIENT
Start: 2023-06-08 | End: 2023-06-08

## 2023-06-08 RX ORDER — BETAMETHASONE SODIUM PHOSPHATE AND BETAMETHASONE ACETATE 3; 3 MG/ML; MG/ML
24 INJECTION, SUSPENSION INTRA-ARTICULAR; INTRALESIONAL; INTRAMUSCULAR; SOFT TISSUE ONCE
Status: COMPLETED | OUTPATIENT
Start: 2023-06-08 | End: 2023-06-08

## 2023-06-08 RX ORDER — BUPIVACAINE HYDROCHLORIDE 2.5 MG/ML
6 INJECTION, SOLUTION INFILTRATION; PERINEURAL ONCE
Status: COMPLETED | OUTPATIENT
Start: 2023-06-08 | End: 2023-06-08

## 2023-06-08 RX ADMIN — BUPIVACAINE HYDROCHLORIDE 15 MG: 2.5 INJECTION, SOLUTION INFILTRATION; PERINEURAL at 11:53

## 2023-06-08 RX ADMIN — BETAMETHASONE SODIUM PHOSPHATE AND BETAMETHASONE ACETATE 24 MG: 3; 3 INJECTION, SUSPENSION INTRA-ARTICULAR; INTRALESIONAL; INTRAMUSCULAR; SOFT TISSUE at 11:52

## 2023-06-08 RX ADMIN — LIDOCAINE HYDROCHLORIDE 6 ML: 10 INJECTION, SOLUTION INFILTRATION; PERINEURAL at 11:53

## 2023-06-08 NOTE — PROGRESS NOTES
@[facility]@    Spine Surgery  [facility]           Provider: Scherry Ormond, DO          Patient Name: Brock [place] : 1935         Date: 23          PROCEDURE:  Knee Injection  Dx bilateral DJD of the knees    After informed consent patient was put in a seated position,patient was put in a seated position the bilateral  Knee was exposed and draped. The knee joint was identified and prepped with Betadine. A 22g needle was used to inject 2cc celestone, 3cc lidocaine, and 3cc marcaine in each knee with relief of symptoms.   Pt tolerated procedure well, left stable, told to ice the knee today and resume normal activity in 2 days

## 2023-06-08 NOTE — TELEPHONE ENCOUNTER
Patient requested a handicap placard from [place] [id number]. Patients daughter Marine Deal is to pick it up from [address] [place] [address] on 06/09/23.

## 2023-06-20 ENCOUNTER — TELEPHONE (OUTPATIENT)
Dept: FAMILY MEDICINE CLINIC | Age: 88
End: 2023-06-20

## 2023-06-20 RX ORDER — POTASSIUM CHLORIDE 20 MEQ/1
TABLET, EXTENDED RELEASE ORAL
Qty: 60 TABLET | Refills: 0 | OUTPATIENT
Start: 2023-06-20

## 2023-06-20 RX ORDER — CARVEDILOL 6.25 MG/1
6.25 TABLET ORAL 2 TIMES DAILY
Qty: 180 TABLET | Refills: 0 | OUTPATIENT
Start: 2023-06-20

## 2023-06-29 ENCOUNTER — OFFICE VISIT (OUTPATIENT)
Dept: SPORTS MEDICINE | Age: 88
End: 2023-06-29
Payer: COMMERCIAL

## 2023-06-29 VITALS
BODY MASS INDEX: 34.15 KG/M2 | DIASTOLIC BLOOD PRESSURE: 82 MMHG | SYSTOLIC BLOOD PRESSURE: 134 MMHG | TEMPERATURE: 96.8 F | HEIGHT: 64 IN | WEIGHT: 200 LBS

## 2023-06-29 DIAGNOSIS — M17.0 PRIMARY OSTEOARTHRITIS OF BOTH KNEES: Primary | ICD-10-CM

## 2023-06-29 PROCEDURE — G8417 CALC BMI ABV UP PARAM F/U: HCPCS | Performed by: FAMILY MEDICINE

## 2023-06-29 PROCEDURE — 3075F SYST BP GE 130 - 139MM HG: CPT | Performed by: FAMILY MEDICINE

## 2023-06-29 PROCEDURE — G8427 DOCREV CUR MEDS BY ELIG CLIN: HCPCS | Performed by: FAMILY MEDICINE

## 2023-06-29 PROCEDURE — 3079F DIAST BP 80-89 MM HG: CPT | Performed by: FAMILY MEDICINE

## 2023-06-29 PROCEDURE — G8400 PT W/DXA NO RESULTS DOC: HCPCS | Performed by: FAMILY MEDICINE

## 2023-06-29 PROCEDURE — 1090F PRES/ABSN URINE INCON ASSESS: CPT | Performed by: FAMILY MEDICINE

## 2023-06-29 PROCEDURE — 1123F ACP DISCUSS/DSCN MKR DOCD: CPT | Performed by: FAMILY MEDICINE

## 2023-06-29 PROCEDURE — 1036F TOBACCO NON-USER: CPT | Performed by: FAMILY MEDICINE

## 2023-06-29 PROCEDURE — 99213 OFFICE O/P EST LOW 20 MIN: CPT | Performed by: FAMILY MEDICINE

## 2023-06-29 ASSESSMENT — ENCOUNTER SYMPTOMS
DIARRHEA: 0
SHORTNESS OF BREATH: 0
CONSTIPATION: 0
NAUSEA: 0
BACK PAIN: 0

## 2023-07-10 ENCOUNTER — OFFICE VISIT (OUTPATIENT)
Dept: SPORTS MEDICINE | Age: 88
End: 2023-07-10
Payer: COMMERCIAL

## 2023-07-10 VITALS — WEIGHT: 200 LBS | BODY MASS INDEX: 34.15 KG/M2 | HEIGHT: 64 IN | TEMPERATURE: 96.9 F

## 2023-07-10 DIAGNOSIS — M17.0 PRIMARY OSTEOARTHRITIS OF BOTH KNEES: Primary | ICD-10-CM

## 2023-07-10 PROCEDURE — 1123F ACP DISCUSS/DSCN MKR DOCD: CPT | Performed by: FAMILY MEDICINE

## 2023-07-10 PROCEDURE — 99213 OFFICE O/P EST LOW 20 MIN: CPT | Performed by: FAMILY MEDICINE

## 2023-07-10 PROCEDURE — G8427 DOCREV CUR MEDS BY ELIG CLIN: HCPCS | Performed by: FAMILY MEDICINE

## 2023-07-10 PROCEDURE — G8417 CALC BMI ABV UP PARAM F/U: HCPCS | Performed by: FAMILY MEDICINE

## 2023-07-10 PROCEDURE — 1090F PRES/ABSN URINE INCON ASSESS: CPT | Performed by: FAMILY MEDICINE

## 2023-07-10 PROCEDURE — 1036F TOBACCO NON-USER: CPT | Performed by: FAMILY MEDICINE

## 2023-07-10 ASSESSMENT — ENCOUNTER SYMPTOMS
CONSTIPATION: 0
SHORTNESS OF BREATH: 0
NAUSEA: 0
DIARRHEA: 0
BACK PAIN: 0

## 2023-07-10 NOTE — PROGRESS NOTES
generalized with no definite effusion mild edema    Assessment:      Diagnosis Orders   1. Primary osteoarthritis of both knees            Plan:   Permission for the gel injections I suggested the patient use her lidocaine 4% cream to try to keep her symptoms down as well as icing we will get her in as soon as we get permission for the gels       No orders of the defined types were placed in this encounter. No orders of the defined types were placed in this encounter. Follow up:  Return for gel injections.     MARIBETH FLYNN, DO

## 2023-07-25 ENCOUNTER — TELEPHONE (OUTPATIENT)
Dept: PAIN MANAGEMENT | Age: 88
End: 2023-07-25

## 2023-07-25 NOTE — TELEPHONE ENCOUNTER
REFERRAL NUMBER [id number]    BILAT DUROLANE INJ    AUTH FROM 7/10/23-10/10/23    OK to schedule procedure approved as above. Please note sides/levels approved and date range.    (If applicable, sides/levels approved may differ from those ordered)    TO BE SCHEDULED WITH DR Ebony Li

## 2023-07-26 ENCOUNTER — HOSPITAL ENCOUNTER (OUTPATIENT)
Age: 88
Discharge: HOME | End: 2023-07-28
Payer: COMMERCIAL

## 2023-07-26 VITALS — HEIGHT: 64 IN | WEIGHT: 200 LBS | BODY MASS INDEX: 34.15 KG/M2

## 2023-07-26 DIAGNOSIS — R00.2 PALPITATIONS: ICD-10-CM

## 2023-07-26 DIAGNOSIS — I34.0 MITRAL VALVE INSUFFICIENCY, UNSPECIFIED ETIOLOGY: ICD-10-CM

## 2023-07-26 LAB
ECHO AO ROOT DIAM: 3.1 CM
ECHO AO ROOT INDEX: 1.58 CM/M2
ECHO AV AREA PEAK VELOCITY: 1.4 CM2
ECHO AV AREA VTI: 1.3 CM2
ECHO AV AREA/BSA PEAK VELOCITY: 0.7 CM2/M2
ECHO AV AREA/BSA VTI: 0.7 CM2/M2
ECHO AV CUSP MM: 0.7 CM
ECHO AV MEAN GRADIENT: 12 MMHG
ECHO AV MEAN VELOCITY: 1.7 M/S
ECHO AV PEAK GRADIENT: 22 MMHG
ECHO AV PEAK VELOCITY: 2.5 M/S
ECHO AV VELOCITY RATIO: 0.44
ECHO AV VTI: 61.5 CM
ECHO BSA: 2.02 M2
ECHO LA DIAMETER INDEX: 2.4 CM/M2
ECHO LA DIAMETER: 4.7 CM
ECHO LA TO AORTIC ROOT RATIO: 1.52
ECHO LA VOL 2C: 44 ML (ref 22–52)
ECHO LA VOL 2C: 46 ML (ref 22–52)
ECHO LA VOL 4C: 83 ML (ref 22–52)
ECHO LA VOL 4C: 86 ML (ref 22–52)
ECHO LA VOLUME AREA LENGTH: 63 ML
ECHO LA VOLUME INDEX AREA LENGTH: 32 ML/M2 (ref 16–34)
ECHO LV E' LATERAL VELOCITY: 4 CM/S
ECHO LV E' SEPTAL VELOCITY: 5 CM/S
ECHO LV EDV A2C: 54 ML
ECHO LV EDV A4C: 43 ML
ECHO LV EDV BP: 49 ML (ref 56–104)
ECHO LV EDV INDEX A4C: 22 ML/M2
ECHO LV EDV INDEX BP: 25 ML/M2
ECHO LV EDV NDEX A2C: 28 ML/M2
ECHO LV EJECTION FRACTION A2C: 71 %
ECHO LV EJECTION FRACTION A4C: 63 %
ECHO LV EJECTION FRACTION BIPLANE: 67 % (ref 55–100)
ECHO LV ESV A2C: 16 ML
ECHO LV ESV A4C: 16 ML
ECHO LV ESV BP: 17 ML (ref 19–49)
ECHO LV ESV INDEX A2C: 8 ML/M2
ECHO LV ESV INDEX A4C: 8 ML/M2
ECHO LV ESV INDEX BP: 9 ML/M2
ECHO LV FRACTIONAL SHORTENING: 45 % (ref 28–44)
ECHO LV INTERNAL DIMENSION DIASTOLE INDEX: 2.04 CM/M2
ECHO LV INTERNAL DIMENSION DIASTOLIC: 4 CM (ref 3.9–5.3)
ECHO LV INTERNAL DIMENSION SYSTOLIC INDEX: 1.12 CM/M2
ECHO LV INTERNAL DIMENSION SYSTOLIC: 2.2 CM
ECHO LV IVSD: 1.2 CM (ref 0.6–0.9)
ECHO LV IVSS: 1.4 CM
ECHO LV MASS 2D: 145.6 G (ref 67–162)
ECHO LV MASS INDEX 2D: 74.3 G/M2 (ref 43–95)
ECHO LV POSTERIOR WALL DIASTOLIC: 1 CM (ref 0.6–0.9)
ECHO LV POSTERIOR WALL SYSTOLIC: 1.5 CM
ECHO LV RELATIVE WALL THICKNESS RATIO: 0.5
ECHO LVOT AREA: 3.5 CM2
ECHO LVOT AV VTI INDEX: 0.39
ECHO LVOT DIAM: 2.1 CM
ECHO LVOT MEAN GRADIENT: 2 MMHG
ECHO LVOT PEAK GRADIENT: 4 MMHG
ECHO LVOT PEAK VELOCITY: 1.1 M/S
ECHO LVOT STROKE VOLUME INDEX: 42.7 ML/M2
ECHO LVOT SV: 83.8 ML
ECHO LVOT VTI: 24.2 CM
ECHO MV A VELOCITY: 1.77 M/S
ECHO MV AREA VTI: 2 CM2
ECHO MV E DECELERATION TIME (DT): 387.5 MS
ECHO MV E VELOCITY: 1.18 M/S
ECHO MV E/A RATIO: 0.67
ECHO MV E/E' LATERAL: 29.5
ECHO MV E/E' RATIO (AVERAGED): 26.55
ECHO MV E/E' SEPTAL: 23.6
ECHO MV LVOT VTI INDEX: 1.76
ECHO MV MAX VELOCITY: 1.9 M/S
ECHO MV MEAN GRADIENT: 5 MMHG
ECHO MV MEAN VELOCITY: 1 M/S
ECHO MV PEAK GRADIENT: 14 MMHG
ECHO MV VTI: 42.6 CM
ECHO RV INTERNAL DIMENSION: 2 CM
ECHO RV TAPSE: 2.3 CM (ref 1.7–?)
ECHO TV REGURGITANT MAX VELOCITY: 2.88 M/S
ECHO TV REGURGITANT PEAK GRADIENT: 33 MMHG

## 2023-07-26 PROCEDURE — 93306 TTE W/DOPPLER COMPLETE: CPT | Performed by: INTERNAL MEDICINE

## 2023-07-26 PROCEDURE — 93306 TTE W/DOPPLER COMPLETE: CPT

## 2023-07-28 ENCOUNTER — PROCEDURE VISIT (OUTPATIENT)
Dept: SPORTS MEDICINE | Age: 88
End: 2023-07-28

## 2023-07-28 ENCOUNTER — OFFICE VISIT (OUTPATIENT)
Dept: FAMILY MEDICINE CLINIC | Age: 88
End: 2023-07-28
Payer: COMMERCIAL

## 2023-07-28 VITALS
DIASTOLIC BLOOD PRESSURE: 82 MMHG | RESPIRATION RATE: 14 BRPM | OXYGEN SATURATION: 98 % | HEIGHT: 64 IN | SYSTOLIC BLOOD PRESSURE: 120 MMHG | HEART RATE: 56 BPM | TEMPERATURE: 97.5 F | BODY MASS INDEX: 34.33 KG/M2

## 2023-07-28 DIAGNOSIS — E03.9 HYPOTHYROIDISM, UNSPECIFIED TYPE: ICD-10-CM

## 2023-07-28 DIAGNOSIS — M17.0 PRIMARY OSTEOARTHRITIS OF BOTH KNEES: Primary | ICD-10-CM

## 2023-07-28 DIAGNOSIS — I31.39 PERICARDIAL EFFUSION: ICD-10-CM

## 2023-07-28 DIAGNOSIS — I35.0 AORTIC VALVE STENOSIS, ETIOLOGY OF CARDIAC VALVE DISEASE UNSPECIFIED: ICD-10-CM

## 2023-07-28 DIAGNOSIS — I10 ESSENTIAL HYPERTENSION: Primary | ICD-10-CM

## 2023-07-28 PROCEDURE — G8427 DOCREV CUR MEDS BY ELIG CLIN: HCPCS | Performed by: FAMILY MEDICINE

## 2023-07-28 PROCEDURE — 99213 OFFICE O/P EST LOW 20 MIN: CPT | Performed by: FAMILY MEDICINE

## 2023-07-28 PROCEDURE — 1090F PRES/ABSN URINE INCON ASSESS: CPT | Performed by: FAMILY MEDICINE

## 2023-07-28 PROCEDURE — 1036F TOBACCO NON-USER: CPT | Performed by: FAMILY MEDICINE

## 2023-07-28 PROCEDURE — 1123F ACP DISCUSS/DSCN MKR DOCD: CPT | Performed by: FAMILY MEDICINE

## 2023-07-28 PROCEDURE — G8417 CALC BMI ABV UP PARAM F/U: HCPCS | Performed by: FAMILY MEDICINE

## 2023-07-28 ASSESSMENT — ENCOUNTER SYMPTOMS
DIARRHEA: 0
VOMITING: 0

## 2023-07-28 NOTE — PROGRESS NOTES
@[facility]@   Spine Surgery  Advanced Pain Management           Provider: Hemant Sharma DO          Patient Name: John Hamilton : 1935         Date: 23          PROCEDURE:  US Knee Injection  Dx bilateral DJD of the knees    After informed consent the patient was put in a supine position the bilateral  knee was exposed and draped. Using msk US the knee joint was identified and prepped with Betadine. A 22g US guided needle was used to inject 3 mL Durolane in each knee with relief of symptoms.   Pt tolerated procedure well, left stable, told to ice the knee today and resume normal activity in 2 days  US images of procedure were saved
Mookie Young(Attending)

## 2023-08-01 DIAGNOSIS — E03.9 HYPOTHYROIDISM, UNSPECIFIED: ICD-10-CM

## 2023-08-01 RX ORDER — LEVOTHYROXINE SODIUM 88 MCG
88 TABLET ORAL DAILY
Qty: 30 TABLET | Refills: 1 | Status: SHIPPED | OUTPATIENT
Start: 2023-08-01

## 2023-08-30 ENCOUNTER — TELEPHONE (OUTPATIENT)
Dept: PAIN MANAGEMENT | Age: 88
End: 2023-08-30

## 2023-08-30 NOTE — TELEPHONE ENCOUNTER
DOS 7/28/23 - Dr. Veva Fothergill claim - Injections on both knees - Claim #[id number] - Need notes to reprocess this claim. Fax [phone]. Faxed.

## 2023-09-20 ENCOUNTER — OFFICE VISIT (OUTPATIENT)
Age: 88
End: 2023-09-20
Payer: MEDICAID

## 2023-09-20 VITALS — BODY MASS INDEX: 34.15 KG/M2 | TEMPERATURE: 97.4 F | WEIGHT: 200 LBS | HEIGHT: 64 IN

## 2023-09-20 DIAGNOSIS — B35.1 ONYCHOMYCOSIS: ICD-10-CM

## 2023-09-20 DIAGNOSIS — M79.674 PAIN IN TOE OF RIGHT FOOT: Primary | ICD-10-CM

## 2023-09-20 DIAGNOSIS — L60.0 INGROWING NAIL: ICD-10-CM

## 2023-09-20 PROCEDURE — 1123F ACP DISCUSS/DSCN MKR DOCD: CPT | Performed by: PODIATRIST

## 2023-09-20 PROCEDURE — G8427 DOCREV CUR MEDS BY ELIG CLIN: HCPCS | Performed by: PODIATRIST

## 2023-09-20 PROCEDURE — 1036F TOBACCO NON-USER: CPT | Performed by: PODIATRIST

## 2023-09-20 PROCEDURE — 1090F PRES/ABSN URINE INCON ASSESS: CPT | Performed by: PODIATRIST

## 2023-09-20 PROCEDURE — 99213 OFFICE O/P EST LOW 20 MIN: CPT | Performed by: PODIATRIST

## 2023-09-20 PROCEDURE — G8417 CALC BMI ABV UP PARAM F/U: HCPCS | Performed by: PODIATRIST

## 2023-09-20 ASSESSMENT — ENCOUNTER SYMPTOMS
SHORTNESS OF BREATH: 1
BACK PAIN: 1
NAUSEA: 0
VOMITING: 0

## 2023-09-20 NOTE — PROGRESS NOTES
recommended that she continue daily application of the topical antifungal medication. A photograph of the nail plates was uploaded to her media tab, I will compare her toenails to the image at follow-up and determine further course of action at that time. Follow up:  Return in about 3 months (around 12/20/2023). Siva Alexander DPM    Level of medical decision making: low. Please note that this report has been partially produced using speech recognition software which may cause errors including grammar, punctuation, and spelling or words and phrases that may seem inappropriate. If there are questions or concerns please feel free to contact me for clarification.

## 2023-09-25 ENCOUNTER — OFFICE VISIT (OUTPATIENT)
Dept: SPORTS MEDICINE | Age: 88
End: 2023-09-25
Payer: MEDICARE

## 2023-09-25 VITALS — WEIGHT: 200 LBS | TEMPERATURE: 96.9 F | HEIGHT: 64 IN | BODY MASS INDEX: 34.15 KG/M2

## 2023-09-25 DIAGNOSIS — M17.0 PRIMARY OSTEOARTHRITIS OF BOTH KNEES: Primary | ICD-10-CM

## 2023-09-25 PROCEDURE — 99999 PR OFFICE/OUTPT VISIT,PROCEDURE ONLY: CPT | Performed by: FAMILY MEDICINE

## 2023-09-25 PROCEDURE — 20610 DRAIN/INJ JOINT/BURSA W/O US: CPT | Performed by: FAMILY MEDICINE

## 2023-09-25 RX ORDER — LIDOCAINE HYDROCHLORIDE 10 MG/ML
6 INJECTION, SOLUTION EPIDURAL; INFILTRATION; INTRACAUDAL; PERINEURAL ONCE
Status: COMPLETED | OUTPATIENT
Start: 2023-09-25 | End: 2023-09-25

## 2023-09-25 RX ORDER — BETAMETHASONE SODIUM PHOSPHATE AND BETAMETHASONE ACETATE 3; 3 MG/ML; MG/ML
24 INJECTION, SUSPENSION INTRA-ARTICULAR; INTRALESIONAL; INTRAMUSCULAR; SOFT TISSUE ONCE
Status: COMPLETED | OUTPATIENT
Start: 2023-09-25 | End: 2023-09-25

## 2023-09-25 RX ORDER — BUPIVACAINE HYDROCHLORIDE 2.5 MG/ML
6 INJECTION, SOLUTION EPIDURAL; INFILTRATION; INTRACAUDAL ONCE
Status: COMPLETED | OUTPATIENT
Start: 2023-09-25 | End: 2023-09-25

## 2023-09-25 RX ADMIN — BUPIVACAINE HYDROCHLORIDE 15 MG: 2.5 INJECTION, SOLUTION EPIDURAL; INFILTRATION; INTRACAUDAL at 10:14

## 2023-09-25 RX ADMIN — LIDOCAINE HYDROCHLORIDE 6 ML: 10 INJECTION, SOLUTION EPIDURAL; INFILTRATION; INTRACAUDAL; PERINEURAL at 10:14

## 2023-09-25 RX ADMIN — BETAMETHASONE SODIUM PHOSPHATE AND BETAMETHASONE ACETATE 24 MG: 3; 3 INJECTION, SUSPENSION INTRA-ARTICULAR; INTRALESIONAL; INTRAMUSCULAR; SOFT TISSUE at 10:13

## 2023-09-25 NOTE — PROGRESS NOTES
@[facility]@    Spine Surgery  Advanced Pain Management           Provider: Karthik Cuevas DO          Patient Name: Sarai Bush : 1935         Date: 23          PROCEDURE:  Knee Injection  Dx DJD of the bilateral knees    After informed consent patient was put in a seated position,patient was put in a seated position the bilateral  Knee was exposed and draped. The knee joint was identified and prepped with Betadine. A 22g needle was used to inject 2cc celestone, 3cc lidocaine, and 3cc marcaine in each knee with relief of symptoms.   Pt tolerated procedure well, left stable, told to ice the knee today and resume normal activity in 2 days

## 2023-09-27 RX ORDER — CARVEDILOL 6.25 MG/1
6.25 TABLET ORAL 2 TIMES DAILY
Qty: 180 TABLET | Refills: 0 | Status: SHIPPED | OUTPATIENT
Start: 2023-09-27

## 2023-09-27 RX ORDER — POTASSIUM CHLORIDE 20 MEQ/1
TABLET, EXTENDED RELEASE ORAL
Qty: 180 TABLET | Refills: 0 | Status: SHIPPED | OUTPATIENT
Start: 2023-09-27

## 2023-10-06 ENCOUNTER — OFFICE VISIT (OUTPATIENT)
Dept: CARDIOLOGY CLINIC | Age: 88
End: 2023-10-06

## 2023-10-06 VITALS — DIASTOLIC BLOOD PRESSURE: 60 MMHG | HEART RATE: 64 BPM | SYSTOLIC BLOOD PRESSURE: 110 MMHG

## 2023-10-06 DIAGNOSIS — I35.0 NONRHEUMATIC AORTIC VALVE STENOSIS: ICD-10-CM

## 2023-10-06 DIAGNOSIS — I10 ESSENTIAL HYPERTENSION: ICD-10-CM

## 2023-10-06 DIAGNOSIS — E78.2 MIXED HYPERLIPIDEMIA: ICD-10-CM

## 2023-10-06 DIAGNOSIS — R94.31 ABNORMAL EKG: ICD-10-CM

## 2023-10-06 DIAGNOSIS — R00.2 PALPITATIONS: Primary | ICD-10-CM

## 2023-10-06 RX ORDER — REGADENOSON 0.08 MG/ML
0.4 INJECTION, SOLUTION INTRAVENOUS
OUTPATIENT
Start: 2023-10-06

## 2023-10-23 ENCOUNTER — TELEPHONE (OUTPATIENT)
Dept: PRIMARY CARE CLINIC | Age: 88
End: 2023-10-23

## 2023-11-07 ENCOUNTER — TELEPHONE (OUTPATIENT)
Dept: FAMILY MEDICINE CLINIC | Age: 88
End: 2023-11-07

## 2023-11-07 DIAGNOSIS — G25.81 RESTLESS LEG SYNDROME: ICD-10-CM

## 2023-11-07 RX ORDER — ROPINIROLE 0.5 MG/1
TABLET, FILM COATED ORAL
Qty: 90 TABLET | Refills: 1 | Status: SHIPPED | OUTPATIENT
Start: 2023-11-07

## 2023-11-07 NOTE — TELEPHONE ENCOUNTER
Future Appointments    Encounter Information    Provider Department Appt Notes   11/28/2023 MD DIANNE Kna AT [facility] 4 Month follow up

## 2023-11-07 NOTE — TELEPHONE ENCOUNTER
Brielle is calling stating that her grandmother is having a great amount of pain in her hip/low back radiating down her R leg.  She can not move her R leg without picking leg up and moving it.  The pain has been going on x 2 weeks now. (No injury or fall) the patient has been using pain meds to help with the pain.  Patient is not taking her water pills because then she would have to get up and down in bed.  (Staying in bed a lot) So legs are swelling.  Brielle stated that patient does not want to use the ER, states that she would have to be transported by ambulance (does not feel she can get in and out of a car) feels that pt's appt on 11/28/23 is too far away but yet pt would have difficulty getting to appt.  Brielle is asking for your advise as to what to do.

## 2023-11-08 DIAGNOSIS — E03.9 HYPOTHYROIDISM, UNSPECIFIED: ICD-10-CM

## 2023-11-08 RX ORDER — LEVOTHYROXINE SODIUM 88 MCG
88 TABLET ORAL DAILY
Qty: 30 TABLET | Refills: 0 | Status: SHIPPED | OUTPATIENT
Start: 2023-11-08

## 2023-11-08 NOTE — TELEPHONE ENCOUNTER
Spoke with Brielle and notified her. Brielle reports that they will wait until appointment since patient refused ER.

## 2023-11-17 ENCOUNTER — TELEPHONE (OUTPATIENT)
Dept: FAMILY MEDICINE CLINIC | Age: 88
End: 2023-11-17

## 2023-11-17 NOTE — TELEPHONE ENCOUNTER
Osmin Smith from [facility] states patient has a power wheelchair that was originally ordered in October 2022. Due to her insurance switching from [employer] to Medicaid, patient will need updated OVN that state why she needs a w/c. She will be faxing over a guide that shows what medicare requires in the [facility] and is inquiring if this can be discussed/done at her appt on 11/28.     Osmin Smith can be reached at [phone]

## 2023-11-20 ENCOUNTER — TELEPHONE (OUTPATIENT)
Dept: SPORTS MEDICINE | Age: 88
End: 2023-11-20

## 2023-11-20 DIAGNOSIS — M17.0 BILATERAL PRIMARY OSTEOARTHRITIS OF KNEE: ICD-10-CM

## 2023-11-20 RX ORDER — LIDOCAINE 40 MG/G
CREAM TOPICAL
Qty: 45 G | Refills: 1 | OUTPATIENT
Start: 2023-11-20

## 2023-11-20 NOTE — TELEPHONE ENCOUNTER
Left message that appt in January was cancelled, Dr Ebony Li is no longer with [facility], If the patient wishes she can schedule an appointment with Pain management.

## 2023-11-28 ENCOUNTER — OFFICE VISIT (OUTPATIENT)
Dept: FAMILY MEDICINE CLINIC | Age: 88
End: 2023-11-28
Payer: MEDICARE

## 2023-11-28 VITALS
RESPIRATION RATE: 14 BRPM | TEMPERATURE: 97.3 F | HEART RATE: 73 BPM | BODY MASS INDEX: 31.32 KG/M2 | SYSTOLIC BLOOD PRESSURE: 120 MMHG | DIASTOLIC BLOOD PRESSURE: 84 MMHG | OXYGEN SATURATION: 97 % | HEIGHT: 67 IN

## 2023-11-28 DIAGNOSIS — M15.9 GENERALIZED OSTEOARTHRITIS: Primary | ICD-10-CM

## 2023-11-28 DIAGNOSIS — Z91.81 AT RISK FOR FALLS: ICD-10-CM

## 2023-11-28 DIAGNOSIS — M17.9 OSTEOARTHRITIS OF KNEE, UNSPECIFIED LATERALITY, UNSPECIFIED OSTEOARTHRITIS TYPE: ICD-10-CM

## 2023-11-28 DIAGNOSIS — I35.0 AORTIC VALVE STENOSIS, ETIOLOGY OF CARDIAC VALVE DISEASE UNSPECIFIED: ICD-10-CM

## 2023-11-28 DIAGNOSIS — E03.9 HYPOTHYROIDISM, UNSPECIFIED TYPE: ICD-10-CM

## 2023-11-28 DIAGNOSIS — I31.39 PERICARDIAL EFFUSION: ICD-10-CM

## 2023-11-28 DIAGNOSIS — F32.1 MODERATE MAJOR DEPRESSION (HCC): ICD-10-CM

## 2023-11-28 DIAGNOSIS — M16.9 OSTEOARTHRITIS OF HIP, UNSPECIFIED LATERALITY, UNSPECIFIED OSTEOARTHRITIS TYPE: ICD-10-CM

## 2023-11-28 DIAGNOSIS — F11.99 OPIOID USE, UNSPECIFIED WITH UNSPECIFIED OPIOID-INDUCED DISORDER (HCC): ICD-10-CM

## 2023-11-28 LAB — TSH SERPL-MCNC: 4.1 UIU/ML (ref 0.44–3.86)

## 2023-11-28 PROCEDURE — G8417 CALC BMI ABV UP PARAM F/U: HCPCS | Performed by: FAMILY MEDICINE

## 2023-11-28 PROCEDURE — G8484 FLU IMMUNIZE NO ADMIN: HCPCS | Performed by: FAMILY MEDICINE

## 2023-11-28 PROCEDURE — 1036F TOBACCO NON-USER: CPT | Performed by: FAMILY MEDICINE

## 2023-11-28 PROCEDURE — 1123F ACP DISCUSS/DSCN MKR DOCD: CPT | Performed by: FAMILY MEDICINE

## 2023-11-28 PROCEDURE — 1090F PRES/ABSN URINE INCON ASSESS: CPT | Performed by: FAMILY MEDICINE

## 2023-11-28 PROCEDURE — 99214 OFFICE O/P EST MOD 30 MIN: CPT | Performed by: FAMILY MEDICINE

## 2023-11-28 PROCEDURE — G8427 DOCREV CUR MEDS BY ELIG CLIN: HCPCS | Performed by: FAMILY MEDICINE

## 2023-11-28 ASSESSMENT — ENCOUNTER SYMPTOMS: SHORTNESS OF BREATH: 1

## 2023-11-28 NOTE — PROGRESS NOTES
Physical Exam:  Height and weight: WEIGHTWeight - Scale:  (wheelchair), HEIGHTHeight: 170.2 cm (5' 7\")  Cardiopulmonary exam: See Progress Note  Musculoskeletal exam: See Progress Note  Neurological Exam: (Gait, Balance, Coordination): See Progress Note      Symptoms that limit ambulation are chronic knee and back pain and hip pain. Diagnosis that are responsible for these symptoms:  See visit diagnosis list.    Medication or other treatment for these symptoms: See visit medication list .    Progression of ambulation difficulty over time has worsened    Other diagnoses that may relate to ambulatory problems include copd    The patient can not walk 5 feet without stopping. Pace of ambulation is slow and unsteady even while using  Walker--unable to walk at this point with walker     History of falls. Frequency: occasionally   Circumstances leading to falls: weakness bilateral and loss of balance   Marwane Hives is isn't sufficient due to: severe weakness of legs     Ambulatory assistance currently used is  Walker and it is not sufficient due to worsening knee and back pain     Patient's ability to independently perform bathing, toileting, ambulating, grooming, hygiene, and meal preparation  (MRADLs) has worsened and now requires use of power mobility device. Patient is unable to use a manual wheelchair due to severe sob with exertion . Patient is not able to stand up from a seated position without assistance.     A power operated scooter would not be sufficient for this patient's need in the home due to patient can not safely transfer in and out of scooter, home environment does not provide adequate access for maneuvering scooter, lacks postural stability, U/E strength 3/5, decreased ROM, decreased strength    The patient has a mobility limitation that significantly impairs his/her ability to participate in one or more mobility-related activities of daily living (MRADLs)  in the home bathing, toileting,

## 2023-11-29 ENCOUNTER — TELEPHONE (OUTPATIENT)
Dept: FAMILY MEDICINE CLINIC | Age: 88
End: 2023-11-29

## 2023-11-29 NOTE — TELEPHONE ENCOUNTER
Patient called and is asking that if someone can call her and go over her medication list?      She is stating that there are medications that are on her list and she wants to take those off the list.

## 2023-12-05 RX ORDER — LEVOTHYROXINE SODIUM 100 MCG
100 TABLET ORAL DAILY
Qty: 90 TABLET | Refills: 1 | Status: SHIPPED | OUTPATIENT
Start: 2023-12-05

## 2023-12-05 NOTE — PROGRESS NOTES
Kaushik Harrison MD  [facility] Clinical Staff  Tsh slightly elevated - needs to increase synthroid to 100 micrograms daily-o.k for 3 month supply--needs repeat tsh in 10 weeks - f/u after done      Patient informed that new dosage will be sent to pharmacy. Please sign.

## 2023-12-06 RX ORDER — CARVEDILOL 6.25 MG/1
6.25 TABLET ORAL 2 TIMES DAILY
Qty: 180 TABLET | Refills: 0 | Status: SHIPPED | OUTPATIENT
Start: 2023-12-06

## 2023-12-06 NOTE — TELEPHONE ENCOUNTER
Future Appointments    Encounter Information   Provider Department Appt Notes   12/20/2023 Chandler Reyes 3 month follow up  11/6 conf.  with pt   6/7/2024 [facility] 8 month follow up     Past Visits    Date Provider Specialty Visit Type Primary Dx   11/28/2023 John Pablo MD Family Medicine Office Visit Generalized osteoarthriti

## 2023-12-07 ENCOUNTER — TELEPHONE (OUTPATIENT)
Dept: FAMILY MEDICINE CLINIC | Age: 88
End: 2023-12-07

## 2023-12-07 NOTE — TELEPHONE ENCOUNTER
Robbie Narvaez from [facility] called about the ov notes she received. She only got 2 of 10 pages. She is asking if those can please be resent. Thank you.

## 2023-12-07 NOTE — TELEPHONE ENCOUNTER
Jessi from [employer] asking to refax all the OV Notes again only received half of them and if not all received at same date they are denied    Please fax to- [phone]

## 2023-12-28 ENCOUNTER — TELEPHONE (OUTPATIENT)
Dept: FAMILY MEDICINE CLINIC | Age: 88
End: 2023-12-28

## 2023-12-28 NOTE — TELEPHONE ENCOUNTER
Patient's granddaughter (Brittney) has questions from her grandmother's LOV and notes on her after visit summary. Please call to discuss

## 2024-01-17 ENCOUNTER — TELEPHONE (OUTPATIENT)
Dept: FAMILY MEDICINE CLINIC | Age: 89
End: 2024-01-17

## 2024-01-17 NOTE — TELEPHONE ENCOUNTER
Per Medicare and [facility] Patient needs a face to face visit to qualify for hospital bed. Please schedule her for 30 mins Per .

## 2024-01-18 NOTE — TELEPHONE ENCOUNTER
Patient states she is seeing another a doctor at the [facility].  She is going to see if that provider can write the prescription.

## 2024-01-30 ENCOUNTER — TELEPHONE (OUTPATIENT)
Dept: FAMILY MEDICINE CLINIC | Age: 89
End: 2024-01-30

## 2024-01-30 NOTE — TELEPHONE ENCOUNTER
Dean calling in regarding patient wheelchair.  They are faxing over paperwork now that needs to be signed and sent back please.      They are also looking for P&T evaluation signed and PMD standard written order.    She is registering wheelchair with a new company so everything needs to be submitted as new.    Dean [phone]

## 2024-04-03 RX ORDER — POTASSIUM CHLORIDE 20 MEQ/1
TABLET, EXTENDED RELEASE ORAL
Qty: 180 TABLET | Refills: 0 | Status: SHIPPED | OUTPATIENT
Start: 2024-04-03

## 2024-04-03 NOTE — TELEPHONE ENCOUNTER
Future Appointments    Encounter Information   Provider Department Appt Notes   6/7/2024 Arthur Arias, [facility] Cardiology 8 month follow up     Past Visits    Date Provider Specialty Visit Type Primary Dx   11/28/2023 Danny Perales MD Family Medicine Office Visit Generalized osteoarthritis

## 2024-04-12 DIAGNOSIS — G25.81 RESTLESS LEG SYNDROME: Primary | ICD-10-CM

## 2024-04-12 RX ORDER — ROPINIROLE 0.5 MG/1
0.5 TABLET, FILM COATED ORAL NIGHTLY
Qty: 90 TABLET | Refills: 0 | Status: SHIPPED | OUTPATIENT
Start: 2024-04-12
